# Patient Record
Sex: FEMALE | Race: WHITE | NOT HISPANIC OR LATINO | Employment: FULL TIME | URBAN - METROPOLITAN AREA
[De-identification: names, ages, dates, MRNs, and addresses within clinical notes are randomized per-mention and may not be internally consistent; named-entity substitution may affect disease eponyms.]

---

## 2017-12-30 ENCOUNTER — HOSPITAL ENCOUNTER (INPATIENT)
Facility: HOSPITAL | Age: 52
LOS: 2 days | Discharge: HOME | End: 2018-01-02
Attending: EMERGENCY MEDICINE | Admitting: HOSPITALIST

## 2017-12-30 ENCOUNTER — APPOINTMENT (OUTPATIENT)
Dept: CT IMAGING | Facility: HOSPITAL | Age: 52
End: 2017-12-30

## 2017-12-30 ENCOUNTER — APPOINTMENT (OUTPATIENT)
Dept: MRI IMAGING | Facility: HOSPITAL | Age: 52
End: 2017-12-30

## 2017-12-30 DIAGNOSIS — R11.2 INTRACTABLE VOMITING WITH NAUSEA, UNSPECIFIED VOMITING TYPE: Primary | ICD-10-CM

## 2017-12-30 DIAGNOSIS — R42 VERTIGO: ICD-10-CM

## 2017-12-30 LAB
ALBUMIN SERPL-MCNC: 3.8 G/DL (ref 3.2–4.8)
ALBUMIN/GLOB SERPL: 1 G/DL (ref 1.5–2.5)
ALP SERPL-CCNC: 104 U/L (ref 25–100)
ALT SERPL W P-5'-P-CCNC: 32 U/L (ref 7–40)
ANION GAP SERPL CALCULATED.3IONS-SCNC: 17 MMOL/L (ref 3–11)
AST SERPL-CCNC: 105 U/L (ref 0–33)
B-HCG UR QL: NEGATIVE
BACTERIA UR QL AUTO: ABNORMAL /HPF
BASOPHILS # BLD AUTO: 0.03 10*3/MM3 (ref 0–0.2)
BASOPHILS NFR BLD AUTO: 0.6 % (ref 0–1)
BILIRUB SERPL-MCNC: 1.7 MG/DL (ref 0.3–1.2)
BILIRUB UR QL STRIP: NEGATIVE
BUN BLD-MCNC: 6 MG/DL (ref 9–23)
BUN/CREAT SERPL: 7.5 (ref 7–25)
CALCIUM SPEC-SCNC: 8.7 MG/DL (ref 8.7–10.4)
CHLORIDE SERPL-SCNC: 101 MMOL/L (ref 99–109)
CLARITY UR: CLEAR
CO2 SERPL-SCNC: 19 MMOL/L (ref 20–31)
COLOR UR: YELLOW
CREAT BLD-MCNC: 0.8 MG/DL (ref 0.6–1.3)
DEPRECATED RDW RBC AUTO: 64.3 FL (ref 37–54)
EOSINOPHIL # BLD AUTO: 0 10*3/MM3 (ref 0–0.3)
EOSINOPHIL NFR BLD AUTO: 0 % (ref 0–3)
ERYTHROCYTE [DISTWIDTH] IN BLOOD BY AUTOMATED COUNT: 15.5 % (ref 11.3–14.5)
GFR SERPL CREATININE-BSD FRML MDRD: 75 ML/MIN/1.73
GLOBULIN UR ELPH-MCNC: 4 GM/DL
GLUCOSE BLD-MCNC: 184 MG/DL (ref 70–100)
GLUCOSE UR STRIP-MCNC: NEGATIVE MG/DL
HCT VFR BLD AUTO: 35.6 % (ref 34.5–44)
HGB BLD-MCNC: 12.3 G/DL (ref 11.5–15.5)
HGB UR QL STRIP.AUTO: ABNORMAL
HOLD SPECIMEN: NORMAL
HOLD SPECIMEN: NORMAL
HYALINE CASTS UR QL AUTO: ABNORMAL /LPF
IMM GRANULOCYTES # BLD: 0.01 10*3/MM3 (ref 0–0.03)
IMM GRANULOCYTES NFR BLD: 0.2 % (ref 0–0.6)
KETONES UR QL STRIP: NEGATIVE
LEUKOCYTE ESTERASE UR QL STRIP.AUTO: NEGATIVE
LIPASE SERPL-CCNC: 57 U/L (ref 6–51)
LYMPHOCYTES # BLD AUTO: 0.46 10*3/MM3 (ref 0.6–4.8)
LYMPHOCYTES NFR BLD AUTO: 8.9 % (ref 24–44)
MCH RBC QN AUTO: 39.3 PG (ref 27–31)
MCHC RBC AUTO-ENTMCNC: 34.6 G/DL (ref 32–36)
MCV RBC AUTO: 113.7 FL (ref 80–99)
MONOCYTES # BLD AUTO: 0.42 10*3/MM3 (ref 0–1)
MONOCYTES NFR BLD AUTO: 8.1 % (ref 0–12)
NEUTROPHILS # BLD AUTO: 4.25 10*3/MM3 (ref 1.5–8.3)
NEUTROPHILS NFR BLD AUTO: 82.2 % (ref 41–71)
NITRITE UR QL STRIP: NEGATIVE
PH UR STRIP.AUTO: 7 [PH] (ref 5–8)
PLATELET # BLD AUTO: 90 10*3/MM3 (ref 150–450)
PMV BLD AUTO: 11.7 FL (ref 6–12)
POTASSIUM BLD-SCNC: 2.9 MMOL/L (ref 3.5–5.5)
PROT SERPL-MCNC: 7.8 G/DL (ref 5.7–8.2)
PROT UR QL STRIP: NEGATIVE
RBC # BLD AUTO: 3.13 10*6/MM3 (ref 3.89–5.14)
RBC # UR: ABNORMAL /HPF
REF LAB TEST METHOD: ABNORMAL
SODIUM BLD-SCNC: 137 MMOL/L (ref 132–146)
SP GR UR STRIP: 1.01 (ref 1–1.03)
SQUAMOUS #/AREA URNS HPF: ABNORMAL /HPF
UROBILINOGEN UR QL STRIP: ABNORMAL
WBC NRBC COR # BLD: 5.17 10*3/MM3 (ref 3.5–10.8)
WBC UR QL AUTO: ABNORMAL /HPF
WHOLE BLOOD HOLD SPECIMEN: NORMAL
WHOLE BLOOD HOLD SPECIMEN: NORMAL

## 2017-12-30 PROCEDURE — 81001 URINALYSIS AUTO W/SCOPE: CPT | Performed by: EMERGENCY MEDICINE

## 2017-12-30 PROCEDURE — 25010000002 DIPHENHYDRAMINE PER 50 MG: Performed by: EMERGENCY MEDICINE

## 2017-12-30 PROCEDURE — 85025 COMPLETE CBC W/AUTO DIFF WBC: CPT | Performed by: EMERGENCY MEDICINE

## 2017-12-30 PROCEDURE — 81025 URINE PREGNANCY TEST: CPT | Performed by: EMERGENCY MEDICINE

## 2017-12-30 PROCEDURE — 25010000002 LORAZEPAM PER 2 MG: Performed by: EMERGENCY MEDICINE

## 2017-12-30 PROCEDURE — 80053 COMPREHEN METABOLIC PANEL: CPT | Performed by: EMERGENCY MEDICINE

## 2017-12-30 PROCEDURE — 82607 VITAMIN B-12: CPT | Performed by: NURSE PRACTITIONER

## 2017-12-30 PROCEDURE — 70551 MRI BRAIN STEM W/O DYE: CPT

## 2017-12-30 PROCEDURE — 25010000002 ONDANSETRON PER 1 MG: Performed by: EMERGENCY MEDICINE

## 2017-12-30 PROCEDURE — 83690 ASSAY OF LIPASE: CPT | Performed by: EMERGENCY MEDICINE

## 2017-12-30 PROCEDURE — 25010000002 PROMETHAZINE PER 50 MG: Performed by: EMERGENCY MEDICINE

## 2017-12-30 PROCEDURE — 99284 EMERGENCY DEPT VISIT MOD MDM: CPT

## 2017-12-30 PROCEDURE — 70450 CT HEAD/BRAIN W/O DYE: CPT

## 2017-12-30 PROCEDURE — 82746 ASSAY OF FOLIC ACID SERUM: CPT | Performed by: NURSE PRACTITIONER

## 2017-12-30 RX ORDER — LEVOTHYROXINE SODIUM 137 UG/1
137 TABLET ORAL DAILY
COMMUNITY

## 2017-12-30 RX ORDER — FAMOTIDINE 10 MG/ML
20 INJECTION, SOLUTION INTRAVENOUS ONCE
Status: COMPLETED | OUTPATIENT
Start: 2017-12-30 | End: 2017-12-30

## 2017-12-30 RX ORDER — PROMETHAZINE HYDROCHLORIDE 25 MG/ML
12.5 INJECTION, SOLUTION INTRAMUSCULAR; INTRAVENOUS ONCE
Status: COMPLETED | OUTPATIENT
Start: 2017-12-30 | End: 2017-12-30

## 2017-12-30 RX ORDER — ONDANSETRON 2 MG/ML
4 INJECTION INTRAMUSCULAR; INTRAVENOUS ONCE
Status: COMPLETED | OUTPATIENT
Start: 2017-12-30 | End: 2017-12-30

## 2017-12-30 RX ORDER — SODIUM CHLORIDE 0.9 % (FLUSH) 0.9 %
10 SYRINGE (ML) INJECTION AS NEEDED
Status: DISCONTINUED | OUTPATIENT
Start: 2017-12-30 | End: 2018-01-02 | Stop reason: HOSPADM

## 2017-12-30 RX ORDER — DIPHENHYDRAMINE HYDROCHLORIDE 50 MG/ML
50 INJECTION INTRAMUSCULAR; INTRAVENOUS ONCE
Status: COMPLETED | OUTPATIENT
Start: 2017-12-30 | End: 2017-12-30

## 2017-12-30 RX ORDER — LORAZEPAM 2 MG/ML
1 INJECTION INTRAMUSCULAR ONCE
Status: COMPLETED | OUTPATIENT
Start: 2017-12-30 | End: 2017-12-30

## 2017-12-30 RX ORDER — LORAZEPAM 2 MG/ML
0.5 INJECTION INTRAMUSCULAR ONCE
Status: COMPLETED | OUTPATIENT
Start: 2017-12-30 | End: 2017-12-30

## 2017-12-30 RX ORDER — PROMETHAZINE HYDROCHLORIDE 25 MG/ML
6.25 INJECTION, SOLUTION INTRAMUSCULAR; INTRAVENOUS ONCE
Status: COMPLETED | OUTPATIENT
Start: 2017-12-30 | End: 2017-12-30

## 2017-12-30 RX ORDER — DIAZEPAM 5 MG/ML
2 INJECTION, SOLUTION INTRAMUSCULAR; INTRAVENOUS ONCE
Status: DISCONTINUED | OUTPATIENT
Start: 2017-12-30 | End: 2017-12-30

## 2017-12-30 RX ADMIN — ONDANSETRON 4 MG: 2 INJECTION INTRAMUSCULAR; INTRAVENOUS at 16:53

## 2017-12-30 RX ADMIN — SODIUM CHLORIDE 1000 ML: 9 INJECTION, SOLUTION INTRAVENOUS at 16:13

## 2017-12-30 RX ADMIN — PROMETHAZINE HYDROCHLORIDE 12.5 MG: 25 INJECTION INTRAMUSCULAR; INTRAVENOUS at 16:53

## 2017-12-30 RX ADMIN — LORAZEPAM 0.5 MG: 2 INJECTION INTRAMUSCULAR; INTRAVENOUS at 19:52

## 2017-12-30 RX ADMIN — LORAZEPAM 1 MG: 2 INJECTION INTRAMUSCULAR; INTRAVENOUS at 21:25

## 2017-12-30 RX ADMIN — DIPHENHYDRAMINE HYDROCHLORIDE 50 MG: 50 INJECTION INTRAMUSCULAR; INTRAVENOUS at 19:52

## 2017-12-30 RX ADMIN — SODIUM CHLORIDE 1000 ML: 9 INJECTION, SOLUTION INTRAVENOUS at 18:08

## 2017-12-30 RX ADMIN — PROMETHAZINE HYDROCHLORIDE 6.25 MG: 25 INJECTION INTRAMUSCULAR; INTRAVENOUS at 16:14

## 2017-12-30 RX ADMIN — FAMOTIDINE 20 MG: 10 INJECTION, SOLUTION INTRAVENOUS at 18:08

## 2017-12-31 PROBLEM — I10 ESSENTIAL HYPERTENSION: Status: ACTIVE | Noted: 2017-12-31

## 2017-12-31 PROBLEM — R42 DIZZINESS: Status: ACTIVE | Noted: 2017-12-31

## 2017-12-31 PROBLEM — R11.2 INTRACTABLE VOMITING WITH NAUSEA: Status: ACTIVE | Noted: 2017-12-31

## 2017-12-31 PROBLEM — R11.2 NAUSEA WITH VOMITING: Status: ACTIVE | Noted: 2017-12-31

## 2017-12-31 PROBLEM — F10.939 ALCOHOL WITHDRAWAL SYNDROME WITH COMPLICATION (HCC): Status: ACTIVE | Noted: 2017-12-31

## 2017-12-31 PROBLEM — Z78.9 ALCOHOL USE: Status: ACTIVE | Noted: 2017-12-31

## 2017-12-31 LAB
ANION GAP SERPL CALCULATED.3IONS-SCNC: 11 MMOL/L (ref 3–11)
BUN BLD-MCNC: 7 MG/DL (ref 9–23)
BUN/CREAT SERPL: 7.8 (ref 7–25)
CA-I SERPL ISE-MCNC: 1.08 MMOL/L (ref 1.12–1.32)
CALCIUM SPEC-SCNC: 7.7 MG/DL (ref 8.7–10.4)
CHLORIDE SERPL-SCNC: 104 MMOL/L (ref 99–109)
CO2 SERPL-SCNC: 24 MMOL/L (ref 20–31)
CREAT BLD-MCNC: 0.9 MG/DL (ref 0.6–1.3)
DEPRECATED RDW RBC AUTO: 68.3 FL (ref 37–54)
ERYTHROCYTE [DISTWIDTH] IN BLOOD BY AUTOMATED COUNT: 16 % (ref 11.3–14.5)
FOLATE SERPL-MCNC: 5.05 NG/ML (ref 3.2–20)
GFR SERPL CREATININE-BSD FRML MDRD: 65 ML/MIN/1.73
GLUCOSE BLD-MCNC: 71 MG/DL (ref 70–100)
HCT VFR BLD AUTO: 31.3 % (ref 34.5–44)
HGB BLD-MCNC: 10.3 G/DL (ref 11.5–15.5)
MAGNESIUM SERPL-MCNC: 1.3 MG/DL (ref 1.3–2.7)
MCH RBC QN AUTO: 38.6 PG (ref 27–31)
MCHC RBC AUTO-ENTMCNC: 32.9 G/DL (ref 32–36)
MCV RBC AUTO: 117.2 FL (ref 80–99)
PHOSPHATE SERPL-MCNC: 2.9 MG/DL (ref 2.4–5.1)
PLATELET # BLD AUTO: 76 10*3/MM3 (ref 150–450)
PMV BLD AUTO: 12.1 FL (ref 6–12)
POTASSIUM BLD-SCNC: 2.5 MMOL/L (ref 3.5–5.5)
POTASSIUM BLD-SCNC: 3.8 MMOL/L (ref 3.5–5.5)
RBC # BLD AUTO: 2.67 10*6/MM3 (ref 3.89–5.14)
SODIUM BLD-SCNC: 139 MMOL/L (ref 132–146)
T4 FREE SERPL-MCNC: 1.07 NG/DL (ref 0.89–1.76)
TSH SERPL DL<=0.05 MIU/L-ACNC: 0.02 MIU/ML (ref 0.35–5.35)
VIT B12 BLD-MCNC: 342 PG/ML (ref 211–911)
WBC NRBC COR # BLD: 5.86 10*3/MM3 (ref 3.5–10.8)

## 2017-12-31 PROCEDURE — 25010000002 MAGNESIUM SULFATE PER 500 MG OF MAGNESIUM: Performed by: INTERNAL MEDICINE

## 2017-12-31 PROCEDURE — 84132 ASSAY OF SERUM POTASSIUM: CPT | Performed by: INTERNAL MEDICINE

## 2017-12-31 PROCEDURE — 25010000002 THIAMINE PER 100 MG: Performed by: INTERNAL MEDICINE

## 2017-12-31 PROCEDURE — 25010000002 LORAZEPAM PER 2 MG: Performed by: INTERNAL MEDICINE

## 2017-12-31 PROCEDURE — 85027 COMPLETE CBC AUTOMATED: CPT | Performed by: NURSE PRACTITIONER

## 2017-12-31 PROCEDURE — 82330 ASSAY OF CALCIUM: CPT | Performed by: NURSE PRACTITIONER

## 2017-12-31 PROCEDURE — 25010000002 MAGNESIUM SULFATE 2 GM/50ML SOLUTION: Performed by: INTERNAL MEDICINE

## 2017-12-31 PROCEDURE — 83735 ASSAY OF MAGNESIUM: CPT | Performed by: NURSE PRACTITIONER

## 2017-12-31 PROCEDURE — 25010000002 ENOXAPARIN PER 10 MG: Performed by: NURSE PRACTITIONER

## 2017-12-31 PROCEDURE — 84100 ASSAY OF PHOSPHORUS: CPT | Performed by: NURSE PRACTITIONER

## 2017-12-31 PROCEDURE — 84443 ASSAY THYROID STIM HORMONE: CPT | Performed by: NURSE PRACTITIONER

## 2017-12-31 PROCEDURE — 99223 1ST HOSP IP/OBS HIGH 75: CPT | Performed by: INTERNAL MEDICINE

## 2017-12-31 PROCEDURE — 80048 BASIC METABOLIC PNL TOTAL CA: CPT | Performed by: NURSE PRACTITIONER

## 2017-12-31 PROCEDURE — 84439 ASSAY OF FREE THYROXINE: CPT | Performed by: INTERNAL MEDICINE

## 2017-12-31 RX ORDER — LORAZEPAM 1 MG/1
2 TABLET ORAL
Status: DISCONTINUED | OUTPATIENT
Start: 2017-12-31 | End: 2018-01-02 | Stop reason: HOSPADM

## 2017-12-31 RX ORDER — POTASSIUM CHLORIDE 750 MG/1
40 CAPSULE, EXTENDED RELEASE ORAL AS NEEDED
Status: DISCONTINUED | OUTPATIENT
Start: 2017-12-31 | End: 2018-01-02 | Stop reason: HOSPADM

## 2017-12-31 RX ORDER — SODIUM CHLORIDE 0.9 % (FLUSH) 0.9 %
1-10 SYRINGE (ML) INJECTION AS NEEDED
Status: DISCONTINUED | OUTPATIENT
Start: 2017-12-31 | End: 2018-01-02 | Stop reason: HOSPADM

## 2017-12-31 RX ORDER — LORAZEPAM 2 MG/ML
1 INJECTION INTRAMUSCULAR ONCE
Status: COMPLETED | OUTPATIENT
Start: 2017-12-31 | End: 2017-12-31

## 2017-12-31 RX ORDER — LORAZEPAM 2 MG/ML
2 INJECTION INTRAMUSCULAR
Status: DISCONTINUED | OUTPATIENT
Start: 2017-12-31 | End: 2018-01-02 | Stop reason: HOSPADM

## 2017-12-31 RX ORDER — ACETAMINOPHEN 325 MG/1
650 TABLET ORAL EVERY 4 HOURS PRN
Status: DISCONTINUED | OUTPATIENT
Start: 2017-12-31 | End: 2018-01-02 | Stop reason: HOSPADM

## 2017-12-31 RX ORDER — CHLORDIAZEPOXIDE HYDROCHLORIDE 25 MG/1
50 CAPSULE, GELATIN COATED ORAL EVERY 8 HOURS SCHEDULED
Status: DISCONTINUED | OUTPATIENT
Start: 2017-12-31 | End: 2018-01-01

## 2017-12-31 RX ORDER — MAGNESIUM SULFATE HEPTAHYDRATE 40 MG/ML
2 INJECTION, SOLUTION INTRAVENOUS AS NEEDED
Status: DISCONTINUED | OUTPATIENT
Start: 2017-12-31 | End: 2018-01-02 | Stop reason: HOSPADM

## 2017-12-31 RX ORDER — FOLIC ACID 1 MG/1
1 TABLET ORAL DAILY
Status: DISCONTINUED | OUTPATIENT
Start: 2018-01-01 | End: 2018-01-02 | Stop reason: HOSPADM

## 2017-12-31 RX ORDER — POTASSIUM CHLORIDE 7.45 MG/ML
10 INJECTION INTRAVENOUS
Status: DISCONTINUED | OUTPATIENT
Start: 2017-12-31 | End: 2018-01-02 | Stop reason: HOSPADM

## 2017-12-31 RX ORDER — POTASSIUM CHLORIDE 1.5 G/1.77G
40 POWDER, FOR SOLUTION ORAL AS NEEDED
Status: DISCONTINUED | OUTPATIENT
Start: 2017-12-31 | End: 2018-01-02 | Stop reason: HOSPADM

## 2017-12-31 RX ORDER — LORAZEPAM 2 MG/ML
1 INJECTION INTRAMUSCULAR
Status: DISCONTINUED | OUTPATIENT
Start: 2017-12-31 | End: 2018-01-02 | Stop reason: HOSPADM

## 2017-12-31 RX ORDER — MAGNESIUM SULFATE HEPTAHYDRATE 40 MG/ML
4 INJECTION, SOLUTION INTRAVENOUS AS NEEDED
Status: DISCONTINUED | OUTPATIENT
Start: 2017-12-31 | End: 2018-01-02 | Stop reason: HOSPADM

## 2017-12-31 RX ORDER — NAPROXEN SODIUM 220 MG
TABLET ORAL
COMMUNITY

## 2017-12-31 RX ORDER — LEVOTHYROXINE SODIUM 137 UG/1
137 TABLET ORAL
Status: DISCONTINUED | OUTPATIENT
Start: 2017-12-31 | End: 2017-12-31

## 2017-12-31 RX ORDER — LORAZEPAM 1 MG/1
1 TABLET ORAL
Status: DISCONTINUED | OUTPATIENT
Start: 2017-12-31 | End: 2018-01-02 | Stop reason: HOSPADM

## 2017-12-31 RX ORDER — DIPHENOXYLATE HYDROCHLORIDE AND ATROPINE SULFATE 2.5; .025 MG/1; MG/1
1 TABLET ORAL DAILY
Status: DISCONTINUED | OUTPATIENT
Start: 2018-01-01 | End: 2018-01-02 | Stop reason: HOSPADM

## 2017-12-31 RX ORDER — THIAMINE MONONITRATE (VIT B1) 100 MG
100 TABLET ORAL DAILY
Status: DISCONTINUED | OUTPATIENT
Start: 2018-01-01 | End: 2018-01-02 | Stop reason: HOSPADM

## 2017-12-31 RX ADMIN — MAGNESIUM SULFATE HEPTAHYDRATE 2 G: 40 INJECTION, SOLUTION INTRAVENOUS at 13:34

## 2017-12-31 RX ADMIN — CHLORDIAZEPOXIDE HYDROCHLORIDE 50 MG: 25 CAPSULE ORAL at 21:36

## 2017-12-31 RX ADMIN — LEVOTHYROXINE SODIUM 137 MCG: 137 TABLET ORAL at 06:36

## 2017-12-31 RX ADMIN — POTASSIUM CHLORIDE 40 MEQ: 750 CAPSULE, EXTENDED RELEASE ORAL at 11:53

## 2017-12-31 RX ADMIN — LORAZEPAM 1 MG: 2 INJECTION INTRAMUSCULAR; INTRAVENOUS at 00:09

## 2017-12-31 RX ADMIN — POTASSIUM CHLORIDE 40 MEQ: 750 CAPSULE, EXTENDED RELEASE ORAL at 15:44

## 2017-12-31 RX ADMIN — CHLORDIAZEPOXIDE HYDROCHLORIDE 50 MG: 25 CAPSULE ORAL at 13:23

## 2017-12-31 RX ADMIN — POTASSIUM CHLORIDE 40 MEQ: 750 CAPSULE, EXTENDED RELEASE ORAL at 09:04

## 2017-12-31 RX ADMIN — MAGNESIUM SULFATE IN WATER 4 G: 40 INJECTION, SOLUTION INTRAVENOUS at 09:44

## 2017-12-31 RX ADMIN — THIAMINE HYDROCHLORIDE 100 ML/HR: 100 INJECTION, SOLUTION INTRAMUSCULAR; INTRAVENOUS at 09:44

## 2017-12-31 RX ADMIN — ACETAMINOPHEN 650 MG: 325 TABLET, FILM COATED ORAL at 11:53

## 2017-12-31 RX ADMIN — ENOXAPARIN SODIUM 40 MG: 40 INJECTION SUBCUTANEOUS at 06:36

## 2017-12-31 RX ADMIN — CHLORDIAZEPOXIDE HYDROCHLORIDE 50 MG: 25 CAPSULE ORAL at 02:10

## 2018-01-01 ENCOUNTER — APPOINTMENT (OUTPATIENT)
Dept: GENERAL RADIOLOGY | Facility: HOSPITAL | Age: 53
End: 2018-01-01

## 2018-01-01 LAB
ALBUMIN SERPL-MCNC: 3 G/DL (ref 3.2–4.8)
ALBUMIN/GLOB SERPL: 0.9 G/DL (ref 1.5–2.5)
ALP SERPL-CCNC: 78 U/L (ref 25–100)
ALT SERPL W P-5'-P-CCNC: 22 U/L (ref 7–40)
ANION GAP SERPL CALCULATED.3IONS-SCNC: 11 MMOL/L (ref 3–11)
AST SERPL-CCNC: 59 U/L (ref 0–33)
BILIRUB SERPL-MCNC: 1.3 MG/DL (ref 0.3–1.2)
BUN BLD-MCNC: 5 MG/DL (ref 9–23)
BUN/CREAT SERPL: 7.1 (ref 7–25)
CALCIUM SPEC-SCNC: 7.4 MG/DL (ref 8.7–10.4)
CHLORIDE SERPL-SCNC: 105 MMOL/L (ref 99–109)
CO2 SERPL-SCNC: 18 MMOL/L (ref 20–31)
CREAT BLD-MCNC: 0.7 MG/DL (ref 0.6–1.3)
DEPRECATED RDW RBC AUTO: 66.4 FL (ref 37–54)
ERYTHROCYTE [DISTWIDTH] IN BLOOD BY AUTOMATED COUNT: 15.5 % (ref 11.3–14.5)
FLUAV SUBTYP SPEC NAA+PROBE: NOT DETECTED
FLUBV RNA ISLT QL NAA+PROBE: NOT DETECTED
GFR SERPL CREATININE-BSD FRML MDRD: 88 ML/MIN/1.73
GLOBULIN UR ELPH-MCNC: 3.3 GM/DL
GLUCOSE BLD-MCNC: 95 MG/DL (ref 70–100)
HCT VFR BLD AUTO: 30.9 % (ref 34.5–44)
HGB BLD-MCNC: 10.3 G/DL (ref 11.5–15.5)
MAGNESIUM SERPL-MCNC: 2 MG/DL (ref 1.3–2.7)
MCH RBC QN AUTO: 39 PG (ref 27–31)
MCHC RBC AUTO-ENTMCNC: 33.3 G/DL (ref 32–36)
MCV RBC AUTO: 117 FL (ref 80–99)
PLATELET # BLD AUTO: 71 10*3/MM3 (ref 150–450)
PMV BLD AUTO: 12.1 FL (ref 6–12)
POTASSIUM BLD-SCNC: 3.2 MMOL/L (ref 3.5–5.5)
PROT SERPL-MCNC: 6.3 G/DL (ref 5.7–8.2)
RBC # BLD AUTO: 2.64 10*6/MM3 (ref 3.89–5.14)
SODIUM BLD-SCNC: 134 MMOL/L (ref 132–146)
WBC NRBC COR # BLD: 5.66 10*3/MM3 (ref 3.5–10.8)

## 2018-01-01 PROCEDURE — 25010000002 ENOXAPARIN PER 10 MG: Performed by: NURSE PRACTITIONER

## 2018-01-01 PROCEDURE — 71045 X-RAY EXAM CHEST 1 VIEW: CPT

## 2018-01-01 PROCEDURE — 87502 INFLUENZA DNA AMP PROBE: CPT | Performed by: INTERNAL MEDICINE

## 2018-01-01 PROCEDURE — 80053 COMPREHEN METABOLIC PANEL: CPT | Performed by: INTERNAL MEDICINE

## 2018-01-01 PROCEDURE — 99233 SBSQ HOSP IP/OBS HIGH 50: CPT | Performed by: INTERNAL MEDICINE

## 2018-01-01 PROCEDURE — 25010000002 THIAMINE PER 100 MG: Performed by: INTERNAL MEDICINE

## 2018-01-01 PROCEDURE — 84481 FREE ASSAY (FT-3): CPT | Performed by: INTERNAL MEDICINE

## 2018-01-01 PROCEDURE — 85027 COMPLETE CBC AUTOMATED: CPT | Performed by: INTERNAL MEDICINE

## 2018-01-01 PROCEDURE — 83735 ASSAY OF MAGNESIUM: CPT | Performed by: INTERNAL MEDICINE

## 2018-01-01 PROCEDURE — 25010000002 MAGNESIUM SULFATE PER 500 MG OF MAGNESIUM: Performed by: INTERNAL MEDICINE

## 2018-01-01 RX ORDER — CHLORDIAZEPOXIDE HYDROCHLORIDE 25 MG/1
25 CAPSULE, GELATIN COATED ORAL EVERY 8 HOURS SCHEDULED
Status: DISCONTINUED | OUTPATIENT
Start: 2018-01-01 | End: 2018-01-02 | Stop reason: HOSPADM

## 2018-01-01 RX ADMIN — THIAMINE HYDROCHLORIDE 100 ML/HR: 100 INJECTION, SOLUTION INTRAMUSCULAR; INTRAVENOUS at 09:09

## 2018-01-01 RX ADMIN — POTASSIUM CHLORIDE 40 MEQ: 750 CAPSULE, EXTENDED RELEASE ORAL at 17:45

## 2018-01-01 RX ADMIN — CHLORDIAZEPOXIDE HYDROCHLORIDE 25 MG: 25 CAPSULE ORAL at 15:34

## 2018-01-01 RX ADMIN — CHLORDIAZEPOXIDE HYDROCHLORIDE 25 MG: 25 CAPSULE ORAL at 21:36

## 2018-01-01 RX ADMIN — ACETAMINOPHEN 650 MG: 325 TABLET, FILM COATED ORAL at 10:19

## 2018-01-01 RX ADMIN — POTASSIUM CHLORIDE 40 MEQ: 750 CAPSULE, EXTENDED RELEASE ORAL at 21:36

## 2018-01-01 RX ADMIN — Medication 1 TABLET: at 09:09

## 2018-01-01 RX ADMIN — Medication 100 MG: at 09:09

## 2018-01-01 RX ADMIN — CHLORDIAZEPOXIDE HYDROCHLORIDE 50 MG: 25 CAPSULE ORAL at 05:36

## 2018-01-01 RX ADMIN — Medication 5 MG: at 21:36

## 2018-01-01 RX ADMIN — ENOXAPARIN SODIUM 40 MG: 40 INJECTION SUBCUTANEOUS at 05:36

## 2018-01-01 RX ADMIN — FOLIC ACID 1 MG: 1 TABLET ORAL at 09:09

## 2018-01-01 NOTE — PROGRESS NOTES
Discharge Planning Assessment  [facility]     Patient Name: Irais Love  MRN: [id number]  Today's Date: 1/1/2018    Admit Date: 12/30/2017          Discharge Needs Assessment       01/01/18 1034    Living Environment    Lives With spouse    Living Arrangements house   Lives in house c her spouse in [place], [place].    Home Accessibility bed and bath on same level    Stair Railings at Home inside, present on left side    Type of Financial/Environmental Concern none    Transportation Available car;family or friend will provide    Discharge Needs Assessment    Equipment Currently Used at Home none    Equipment Needed After Discharge none      01/01/18 1033    Living Environment    Provides Primary Care For no one    Quality Of Family Relationships supportive    Able to Return to Prior Living Arrangements yes    Discharge Needs Assessment    Concerns To Be Addressed discharge planning concerns;substance/tobacco abuse/use concerns    Readmission Within The Last 30 Days no previous admission in last 30 days            Discharge Plan       01/01/18 1036    Case Management/Social Work Plan    Plan Home c spouse    Patient/Family In Agreement With Plan yes    Additional Comments Spoke to Mrs. Love @ [facility].  She lives c her spouse in 1 level home in [place], [place].  She is independent c ADL's.  She has never used DME, HH or Home O2. She works as a  in a business with her spouse. Her spouse is getting airline tickets for 1/2/18 to return to their home.   MSW consulted to speak c Ms. Love about ETOH abuse and resources.  CM will follow.        Discharge Placement     No information found                Demographic Summary       01/01/18 1025    Referral Information    Referral Source admission list    Reason For Consult discharge planning    Contact Information    Permission Granted to Share Information With             Functional Status       01/01/18 1033    Functional Status Current     Ambulation 0-->independent    Transferring 0-->independent    Toileting 0-->independent    Bathing 0-->independent    Dressing 0-->independent    Eating 0-->independent    Communication 0-->understands/communicates without difficulty    Swallowing (if score 2 or more for any item, consult Rehab Services) 0-->swallows foods/liquids without difficulty    Functional Status Prior    Ambulation 0-->independent    Transferring 0-->independent    Toileting 0-->independent    Bathing 0-->independent    Dressing 0-->independent    Eating 0-->independent    Communication 0-->understands/communicates without difficulty    Swallowing 0-->swallows foods/liquids without difficulty    IADL    Medications independent    Meal Preparation independent    Housekeeping independent    Laundry independent    Shopping independent    Oral Care independent            Psychosocial     None            Abuse/Neglect     None            Legal     None            Substance Abuse     None            Patient Forms     None          Xander Cuello, RN

## 2018-01-01 NOTE — PROGRESS NOTES
[facility] Medicine Services  PROGRESS NOTE    Patient Name: Irais Love  : 1965  MRN: [id number]    Date of Admission: 2017  Length of Stay: 1  Primary Care Physician: No Known Provider    Subjective   Subjective     CC:    Alcohol withdrawal    HPI:    Still feels a little unsteady on her feet. Cough and nasal congestion. Denies fever.     Review of Systems   HENT: Negative.    Eyes: Negative.    Respiratory: Negative.    Cardiovascular: Negative.    Gastrointestinal: Negative.    Endocrine: Negative.    Genitourinary: Negative.    Musculoskeletal: Negative.    Skin: Negative.    Allergic/Immunologic: Negative.    Neurological: Positive for tremors and weakness.   Hematological: Negative.    Psychiatric/Behavioral: Negative.          Otherwise ROS is negative except as mentioned in the HPI.    Objective   Objective     Vital Signs:   Temp:  [97.5 °F (36.4 °C)-99 °F (37.2 °C)] 99 °F (37.2 °C)  Heart Rate:  [] 86  Resp:  [16-20] 18  BP: (141-165)/(75-86) 165/79        Physical Exam:    Constitutional -in bed, asleep but arouses easily to voice. Very slight hand tremor noted. Able to ambulate unassisted to bathroom, a little unsteady at first.  HEENT-NCAT, mucous membranes moist  CV-regular rate and rhythm, no murmur  Resp-CTAB, no wheezes, rhonchi or rales  Abd-soft, non-tender, non-distended, normo active bowel sounds  Ext-No lower extremity cyanosis, clubbing or edema bilaterally  Neuro-alert, speech clear, moves all extremities, mild hand tremors  Psych-still with slightly anxious affect   Skin- No rash on exposed UE or LE bilaterally      Results Reviewed:  I have personally reviewed current lab, radiology, and data and agree.      Results from last 7 days  Lab Units 18  0622 17  0509 17  1503   WBC 10*3/mm3 5.66 5.86 5.17   HEMOGLOBIN g/dL 10.3* 10.3* 12.3   HEMATOCRIT % 30.9* 31.3* 35.6   PLATELETS 10*3/mm3 71* 76* 90*       Results from last 7  days  Lab Units 01/01/18  0622 12/31/17  2102 12/31/17  0509 12/30/17  1503   SODIUM mmol/L 134  --  139 137   POTASSIUM mmol/L 3.2* 3.8 2.5* 2.9*   CHLORIDE mmol/L 105  --  104 101   CO2 mmol/L 18.0*  --  24.0 19.0*   BUN mg/dL 5*  --  7* 6*   CREATININE mg/dL 0.70  --  0.90 0.80   GLUCOSE mg/dL 95  --  71 184*   CALCIUM mg/dL 7.4*  --  7.7* 8.7   ALT (SGPT) U/L 22  --   --  32   AST (SGOT) U/L 59*  --   --  105*     No results found for: BNP  No results found for: PHART    Microbiology Results Abnormal     None          Imaging Results (last 24 hours)     Procedure Component Value Units Date/Time    CT Head Without Contrast [[id number]] Collected:  12/31/17 1340     Updated:  12/31/17 1352    Narrative:       EXAMINATION: CT HEAD WO CONTRAST - 12/31/2017     INDICATION: Vertigo, vomiting.      TECHNIQUE: Axial CT of the head without intravenous contrast  administration.     The radiation dose reduction device was turned on for each scan per the  ALARA (As Low as Reasonably Achievable) protocol.     COMPARISON: None.     FINDINGS: Midline structures are symmetric without evidence of mass,  mass effect or midline shift. No intra-axial hemorrhage. Ventricles and  sulci within normal limits. Basal cisterns patent. Globes and orbits  unremarkable. Visualized paranasal sinuses demonstrate circumferential  mucosal thickening of the bilateral maxillary sinuses and ethmoid air  cells; otherwise, grossly clear and well-pneumatized. Mastoid air cells  are grossly clear. Calvarium is intact without depressed fracture or  aggressive lesion.       Impression:       1. No acute intracranial abnormality.  2. Paranasal mucosal edema of the maxillary sinuses and ethmoid air  cells may represent component of sinusitis.     DICTATED:     12/30/2017  EDITED:          12/31/2017     This report was finalized on 12/31/2017 1:50 PM by Dr. Garry Sullivan.                I have reviewed the medications.    chlordiazePOXIDE 25 mg Oral Q8H    enoxaparin 40 mg Subcutaneous Q24H   vitamin B-1 100 mg Oral Daily   And      multivitamin 1 tablet Oral Daily   And      folic acid 1 mg Oral Daily   IV Fluids 1000 mL + additives 100 mL/hr Intravenous Daily       Assessment/Plan   Assessment / Plan     Hospital Problem List     * (Principal)Alcohol withdrawal syndrome with complication    Alcohol use    Dizziness    Essential hypertension    Nausea with vomiting    Intractable vomiting with nausea             Brief Hospital Course to date:  Irais Love is a 52 y.o. female n/v, dizziness and alcohol withdrawal      Assessment & Plan:    Acute Alcohol Withdrawal  - suspect symptoms started after patient developed nausea and vomiting with concurrent inability to drink alcohol. MRI brain obtained with no findings suggestive of acute stroke, and both GI symptoms and dizziness have improved. Spouse says Mrs Love has experienced similar events in the past. Lab work, including elevated LFTs and low platelets consistent with chronic alcohol consumption.    - Patient currently being treated under [facility] protocol with scheduled librium and prn ativan. Thiamine/rally pack also provided daily.    - Will taper scheduled librium today    - Consider serax taper at discharge, but this will depend on how committed patient is to seeking recovery from alcohol abuse. Will discuss again with her this afternoon. Social work to visit patient and provide abstinence resources.    - hospitalization somewhat complicated by fact that patient and spouse are from [place], here visiting family. Their departure flight was scheduled for this moring, but medically I think it would be unwise to rush the discharge so they can make the trip home.  If the patient remains stable from a withdrawal standpoint with tapered benzodiazepines, one may consider discharge tomorrow (early afternoon) - and I discussed this possibility with the patient and her spouse - but we will need to continue to monitor her  response to the lower dose of medications.  They are in agreement with this plan    Cough and sinus congestion  - afebrile, no leukocytosis, however influenza is rampant -- will check flu PCR and a CXR today.    Hypothyroid  - TSH supressed, checked Free T4 (wnl), free T3 pending. Held levothyroxine today, will step down dose at discharge    TCP  - likely due to Etoh     Elevated LFTs    Anemia  - elevated MCV, consistent with bone marrow suppression from alcohol  - continue supplementation  - check b12/folate levels on blood drawn at admit.    Hypokalemia  - likely due to N/V  - improved today, continue to replace k/mag  - bmp am    N/V  - resolved, restart bland diet    DVT Prophylaxis:  lovenox    CODE STATUS: Full Code     Disposition: I expect the patient to be discharged home in 1-2 days.    Jerry Eisenberg MD  01/01/18  8:46 AM

## 2018-01-01 NOTE — PLAN OF CARE
Problem: Patient Care Overview (Adult)  Goal: Plan of Care Review  Outcome: Ongoing (interventions implemented as appropriate)      Problem: Acute Alcohol Withdrawal Syndrome, Risk For/Actual (Adult)  Goal: Signs and Symptoms of Listed Potential Problems Will be Absent or Manageable (Acute Alcohol Withdrawal Syndrome, Risk For/Actual)  Outcome: Ongoing (interventions implemented as appropriate)   01/01/18 1824   Acute Alcohol Withdrawal Syndrome, Risk For/Actual   Problems Assessed (Alcohol Withdrawal Syndrome) all   Problems Present (Alcohol Withdrawal Syndrome) none

## 2018-01-01 NOTE — PLAN OF CARE
Problem: Patient Care Overview (Adult)  Goal: Plan of Care Review  Outcome: Ongoing (interventions implemented as appropriate)    Goal: Adult Individualization and Mutuality  Outcome: Ongoing (interventions implemented as appropriate)    Goal: Discharge Needs Assessment  Outcome: Ongoing (interventions implemented as appropriate)

## 2018-01-02 VITALS
RESPIRATION RATE: 18 BRPM | SYSTOLIC BLOOD PRESSURE: 153 MMHG | WEIGHT: 128 LBS | DIASTOLIC BLOOD PRESSURE: 77 MMHG | OXYGEN SATURATION: 98 % | BODY MASS INDEX: 24.17 KG/M2 | HEIGHT: 61 IN | HEART RATE: 101 BPM | TEMPERATURE: 98.7 F

## 2018-01-02 LAB
POTASSIUM BLD-SCNC: 4.3 MMOL/L (ref 3.5–5.5)
T3FREE SERPL-MCNC: 1.7 PG/ML (ref 2–4.4)

## 2018-01-02 PROCEDURE — 99239 HOSP IP/OBS DSCHRG MGMT >30: CPT | Performed by: HOSPITALIST

## 2018-01-02 PROCEDURE — 25010000002 THIAMINE PER 100 MG: Performed by: INTERNAL MEDICINE

## 2018-01-02 PROCEDURE — 84132 ASSAY OF SERUM POTASSIUM: CPT | Performed by: INTERNAL MEDICINE

## 2018-01-02 PROCEDURE — 25010000002 ENOXAPARIN PER 10 MG: Performed by: NURSE PRACTITIONER

## 2018-01-02 PROCEDURE — 25010000002 MAGNESIUM SULFATE PER 500 MG OF MAGNESIUM: Performed by: INTERNAL MEDICINE

## 2018-01-02 RX ORDER — CHLORDIAZEPOXIDE HYDROCHLORIDE 25 MG/1
25 CAPSULE, GELATIN COATED ORAL EVERY 8 HOURS SCHEDULED
Qty: 8 CAPSULE | Refills: 0 | Status: SHIPPED | OUTPATIENT
Start: 2018-01-02 | End: 2018-01-05

## 2018-01-02 RX ORDER — CHLORDIAZEPOXIDE HYDROCHLORIDE 25 MG/1
25 CAPSULE, GELATIN COATED ORAL EVERY 8 HOURS SCHEDULED
Qty: 2 CAPSULE | Refills: 0 | Status: SHIPPED | OUTPATIENT
Start: 2018-01-02 | End: 2018-01-02

## 2018-01-02 RX ORDER — CHLORDIAZEPOXIDE HYDROCHLORIDE 25 MG/1
25 CAPSULE, GELATIN COATED ORAL DAILY
Status: DISCONTINUED | OUTPATIENT
Start: 2018-01-05 | End: 2018-01-02 | Stop reason: HOSPADM

## 2018-01-02 RX ORDER — CHLORDIAZEPOXIDE HYDROCHLORIDE 25 MG/1
25 CAPSULE, GELATIN COATED ORAL EVERY 12 HOURS
Status: DISCONTINUED | OUTPATIENT
Start: 2018-01-03 | End: 2018-01-02 | Stop reason: HOSPADM

## 2018-01-02 RX ORDER — LANOLIN ALCOHOL/MO/W.PET/CERES
100 CREAM (GRAM) TOPICAL DAILY
Qty: 1 TABLET | Refills: 0 | Status: SHIPPED | OUTPATIENT
Start: 2018-01-03 | End: 2018-01-02

## 2018-01-02 RX ORDER — CHLORDIAZEPOXIDE HYDROCHLORIDE 25 MG/1
25 CAPSULE, GELATIN COATED ORAL DAILY
Qty: 2 CAPSULE | Refills: 0 | Status: SHIPPED | OUTPATIENT
Start: 2018-01-05 | End: 2018-01-02 | Stop reason: HOSPADM

## 2018-01-02 RX ORDER — FOLIC ACID 1 MG/1
1 TABLET ORAL DAILY
Qty: 1 TABLET | Refills: 0 | Status: SHIPPED | OUTPATIENT
Start: 2018-01-03 | End: 2018-01-02

## 2018-01-02 RX ORDER — CHLORDIAZEPOXIDE HYDROCHLORIDE 25 MG/1
25 CAPSULE, GELATIN COATED ORAL EVERY 12 HOURS
Qty: 4 CAPSULE | Refills: 0 | Status: SHIPPED | OUTPATIENT
Start: 2018-01-03 | End: 2018-01-02 | Stop reason: HOSPADM

## 2018-01-02 RX ORDER — DIPHENOXYLATE HYDROCHLORIDE AND ATROPINE SULFATE 2.5; .025 MG/1; MG/1
1 TABLET ORAL DAILY
Qty: 1 TABLET | Refills: 0 | Status: SHIPPED | OUTPATIENT
Start: 2018-01-03 | End: 2018-01-02

## 2018-01-02 RX ORDER — LANOLIN ALCOHOL/MO/W.PET/CERES
100 CREAM (GRAM) TOPICAL DAILY
Qty: 30 TABLET | Refills: 0 | Status: SHIPPED | OUTPATIENT
Start: 2018-01-03

## 2018-01-02 RX ORDER — FOLIC ACID 1 MG/1
1 TABLET ORAL DAILY
Qty: 30 TABLET | Refills: 0 | Status: SHIPPED | OUTPATIENT
Start: 2018-01-03

## 2018-01-02 RX ADMIN — ENOXAPARIN SODIUM 40 MG: 40 INJECTION SUBCUTANEOUS at 05:47

## 2018-01-02 RX ADMIN — Medication 100 MG: at 09:29

## 2018-01-02 RX ADMIN — Medication 1 TABLET: at 09:29

## 2018-01-02 RX ADMIN — FOLIC ACID 1 MG: 1 TABLET ORAL at 09:28

## 2018-01-02 RX ADMIN — THIAMINE HYDROCHLORIDE 100 ML/HR: 100 INJECTION, SOLUTION INTRAMUSCULAR; INTRAVENOUS at 09:27

## 2018-01-02 RX ADMIN — CHLORDIAZEPOXIDE HYDROCHLORIDE 25 MG: 25 CAPSULE ORAL at 05:47

## 2018-01-02 NOTE — NURSING NOTE
Patient is in the process of obtaining a primary provider and will make a follow-up appointment in [place] when they return this evening.

## 2018-01-02 NOTE — PROGRESS NOTES
Continued Stay Note  [facility]     Patient Name: Irais Love  MRN: [id number]  Today's Date: 1/2/2018    Admit Date: 12/30/2017          Discharge Plan       01/02/18 1142    Case Management/Social Work Plan    Plan JAMES provided resources    Patient/Family In Agreement With Plan yes    Additional Comments JAMES met with pt. this morning; pt. was up ambulating in the room.  Pt. is prepared for discharge today.  SW and pt discussed alcohol cessation resources.  Pt. is a resident of [place] and stated she has been to rehab 3-4 times and was aware of services available to her in [place].  Pt. did accept resources offered by JAMES for alcohol abuse treatment.  Pt. denied having any other needs at this time.              Discharge Codes     None        Expected Discharge Date and Time     Expected Discharge Date Expected Discharge Time    Jan 2, 2018             VALENCIA Peña

## 2018-01-02 NOTE — DISCHARGE SUMMARY
[facility] Medicine Services  DISCHARGE SUMMARY    Patient Name: Irais Love  : 1965  MRN: [id number]    Date of Admission: 2017  Date of Discharge:  2018  Length of Stay: 2  Primary Care Physician: No Known Provider    Consults     No orders found from 2017 to 2017.        Hospital Course     Presenting Problem:   Intractable vomiting with nausea, unspecified vomiting type [R11.2]    Active Hospital Problems (** Indicates Principal Problem)    Diagnosis Date Noted   • **Alcohol withdrawal syndrome with complication [F10.239] 2017   • Alcohol use [Z78.9] 2017   • Dizziness [R42] 2017   • Essential hypertension [I10] 2017   • Nausea with vomiting [R11.2] 2017   • Intractable vomiting with nausea [R11.2] 2017      Resolved Hospital Problems    Diagnosis Date Noted Date Resolved   No resolved problems to display.          Hospital Course:  Irais Love is a 52 y.o. female who presents [facility] emergency department with complaints of nausea, vomiting, diarrhea, lightheadedness, and dizziness.  reports that she drinks at least 3 glasses of wine with breakfast.     Pt was for alcohol withdrawal.  Started on Librium and tolerated tapering dose. Plan to fly back to [place] today. Discharged on tapering doses of Librium, daily folate, thiamine.      Day of Discharge     HPI:   Patient reports feeling well.  Denies trauma or feeling anxious. No PRN ativan doses needed.    Review of Systems  Gen- No fevers or chills  CV- No chest pain or palpitations  Resp- No cough or dyspnea  GI- No N/V/D or abd pain    Otherwise ROS is negative except as mentioned in the HPI.    Vital Signs:   Temp:  [98.4 °F (36.9 °C)-98.7 °F (37.1 °C)] 98.7 °F (37.1 °C)  Heart Rate:  [101] 101  Resp:  [18] 18  BP: (153-162)/(77-80) 153/77     Physical Exam:  Constitutional: No acute distress, awake, alert on RA  Eyes: PERRLA, sclerae anicteric,  no conjunctival injection  HENT: NCAT, mucous membranes moist  Neck: Supple, no thyromegaly, no lymphadenopathy, trachea midline  Respiratory: Clear to auscultation bilaterally, nonlabored respirations   Cardiovascular: RRR, no murmurs, rubs, or gallops, palpable pedal pulses bilaterally  Gastrointestinal: Positive bowel sounds, soft, nontender, nondistended  Musculoskeletal: No bilateral ankle edema, no clubbing or cyanosis to extremities  Psychiatric: Appropriate affect, cooperative  Neurologic: Oriented x 3, strength symmetric in all extremities, Cranial Nerves grossly intact to confrontation, speech clear  Skin: No rashes    Pertinent  and/or Most Recent Results         Results from last 7 days  Lab Units 01/02/18  0234 01/01/18  0622 12/31/17  2102 12/31/17  0509 12/30/17  1503   WBC 10*3/mm3  --  5.66  --  5.86 5.17   HEMOGLOBIN g/dL  --  10.3*  --  10.3* 12.3   HEMATOCRIT %  --  30.9*  --  31.3* 35.6   PLATELETS 10*3/mm3  --  71*  --  76* 90*   SODIUM mmol/L  --  134  --  139 137   POTASSIUM mmol/L 4.3 3.2* 3.8 2.5* 2.9*   CHLORIDE mmol/L  --  105  --  104 101   CO2 mmol/L  --  18.0*  --  24.0 19.0*   BUN mg/dL  --  5*  --  7* 6*   CREATININE mg/dL  --  0.70  --  0.90 0.80   GLUCOSE mg/dL  --  95  --  71 184*   CALCIUM mg/dL  --  7.4*  --  7.7* 8.7       Results from last 7 days  Lab Units 01/01/18  0622 12/30/17  1503   BILIRUBIN mg/dL 1.3* 1.7*   ALK PHOS U/L 78 104*   ALT (SGPT) U/L 22 32   AST (SGOT) U/L 59* 105*           Invalid input(s): TG, LDLREALC    Results from last 7 days  Lab Units 12/31/17  0509   TSH mIU/mL 0.016*     Brief Urine Lab Results  (Last result in the past 365 days)      Color   Clarity   Blood   Leuk Est   Nitrite   Protein   CREAT   Urine HCG        12/30/17 1537               Negative     12/30/17 1537 Yellow Clear Moderate (2+)(A) Negative Negative Negative               Microbiology Results Abnormal     Procedure Component Value - Date/Time    Influenza A & B, RT PCR - Swab,  Nasopharynx [[id number]]  (Normal) Collected:  01/01/18 1000    Lab Status:  Final result Specimen:  Swab from Nasopharynx Updated:  01/01/18 1117     Influenza A PCR Not Detected     Influenza B PCR Not Detected          Imaging Results (all)     Procedure Component Value Units Date/Time    MRI Brain Without Contrast [[id number]] Collected:  12/30/17 2057     Updated:  12/30/17 2218    Narrative:       EXAM:    MR Head Without Intravenous Contrast    CLINICAL HISTORY:    52 years old, female; Signs and symptoms; Dizziness and other: Nausea,   vomiting; Patient HX: Possible acute CVA, dizziness, vertigo, nausea, vomiting;   Additional info: Intractable vertigo, concern for cerebellar stroke    TECHNIQUE:    Magnetic resonance images of the head/brain without intravenous contrast in   multiple planes.    COMPARISON:    CT HEAD WO CONTRAST 2017-12-30 17:52    FINDINGS:      Brain:  The cortical sulci are enlarged consistent with cerebral atrophy.  No   hemorrhage.  No acute infarct.      Ventricles:  The ventricles are enlarged consistent with volume loss.      Bones/joints:  Unremarkable.      Sinuses:  Minimal scattered sinus disease.      Mastoid air cells:  Unremarkable as visualized.  No mastoid effusion.      Orbits:  Unremarkable as visualized.      Impression:           No acute findings. No acute infarction    Cerebral atrophy      THIS DOCUMENT HAS BEEN ELECTRONICALLY SIGNED BY BRE GOLD MD    CT Head Without Contrast [[id number]] Collected:  12/31/17 1340     Updated:  12/31/17 1352    Narrative:       EXAMINATION: CT HEAD WO CONTRAST - 12/31/2017     INDICATION: Vertigo, vomiting.      TECHNIQUE: Axial CT of the head without intravenous contrast  administration.     The radiation dose reduction device was turned on for each scan per the  ALARA (As Low as Reasonably Achievable) protocol.     COMPARISON: None.     FINDINGS: Midline structures are symmetric without evidence of mass,  mass effect or midline  shift. No intra-axial hemorrhage. Ventricles and  sulci within normal limits. Basal cisterns patent. Globes and orbits  unremarkable. Visualized paranasal sinuses demonstrate circumferential  mucosal thickening of the bilateral maxillary sinuses and ethmoid air  cells; otherwise, grossly clear and well-pneumatized. Mastoid air cells  are grossly clear. Calvarium is intact without depressed fracture or  aggressive lesion.       Impression:       1. No acute intracranial abnormality.  2. Paranasal mucosal edema of the maxillary sinuses and ethmoid air  cells may represent component of sinusitis.     DICTATED:     12/30/2017  EDITED:          12/31/2017     This report was finalized on 12/31/2017 1:50 PM by Dr. Garry Sullivan.       XR Chest 1 View [[id number]] Collected:  01/01/18 1020     Updated:  01/01/18 1827    Narrative:       EXAMINATION: XR CHEST 1 VW-      INDICATION: Cough, congestion; R11.2-Nausea with vomiting, unspecified;  R42-Dizziness and giddiness.      COMPARISON: None.     FINDINGS: Cardiac silhouette within normal limits. Pulmonary vascularity  within normal limits. No focal opacification or consolidation. No  pneumothorax or pleural effusion.           Impression:       No acute cardiopulmonary process.     D:  01/01/2018  E:  01/01/2018     This report was finalized on 1/1/2018 6:25 PM by Dr. Garry Sullivan.                    Discharge Details      Irais Love   Home Medication Instructions ELDER:[id number]    Printed on:01/02/18 1823   Medication Information                      Acetaminophen (TYLENOL PO)  Take  by mouth. prn             chlordiazePOXIDE (LIBRIUM) 25 MG capsule  Take 1 capsule every 8 hours for 2doses, then every 12 hours for 4 doses, then once daily for 2 doses             folic acid (FOLVITE) 1 MG tablet  Take 1 tablet by mouth Daily.             IBUPROFEN PO  Take  by mouth. prn             levothyroxine (SYNTHROID, LEVOTHROID) 137 MCG tablet  Take 137 mcg by mouth Daily.              Multiple Vitamins-Minerals (MULTIVITAMIN ADULTS) tablet  Take 1 tablet by mouth Daily.             naproxen sodium (ALEVE) 220 MG tablet  Take  by mouth. prn             Norgestrel-Ethinyl Estradiol (LOW-OGESTREL PO)  Take 1 tablet by mouth Daily.             thiamine (VITAMIN B1) 100 MG tablet  Take 1 tablet by mouth Daily.                   Discharge Disposition:  Home or Self Care    Discharge Diet:  Regular    Discharge Activity:  As tolerated    No future appointments.    Additional Instructions for the Follow-ups that You Need to Schedule     Discharge Follow-up with PCP    As directed    Follow Up Details:  1-2 weeks                     Time Spent on Discharge:  >35min    Brandi Clark MD  01/02/18  6:23 PM

## 2018-01-02 NOTE — PAYOR COMM NOTE
"Irais Love (52 y.o. Female)     Date of Birth Social Security Number Address Home Phone MRN    1965  [id number] [facility] [phone] [id number]    Gnosticism Marital Status          None        Admission Date Admission Type Admitting Provider Attending Provider Department, Room/Bed    17 Emergency Brandi Clark MD  [facility] 5G, S560/1    Discharge Date Discharge Disposition Discharge Destination        2018 Home or Self Care             Attending Provider: (none)    Allergies:  No Known Allergies    Isolation:  None   Infection:  None   Code Status:  FULL    Ht:  154.9 cm (61\")   Wt:  58.1 kg (128 lb)    Admission Cmt:  None   Principal Problem:  Alcohol withdrawal syndrome with complication [F10.239]                 Active Insurance as of 2017     Primary Coverage     Payor Plan Insurance Group Employer/Plan Group    [employer] MEJ[employer] RULE [id number]     Payor Plan Address Payor Plan Phone Number Effective From Effective To    [address] [address] [phone] [address]     [place] [address]       Subscriber Name Subscriber Birth Date Member ID       CHASE LOVE SR 1964 [id number]                 Emergency Contacts      (Rel.) Home Phone Work Phone Mobile Phone    Fco Love (Spouse) [phone] -- --               History & Physical      Teddy Pires MD at 2017  1:12 AM              [facility] Medicine Services  HISTORY AND PHYSICAL    Patient Name: Irais Love  : 1965  MRN: [id number]  Primary Care Physician: No Known Provider    Subjective   Subjective     Chief Complaint: Nausea/Vomting/Dizziness    HPI:  Irais Love is a 52 y.o. female who presents [facility] emergency department with complaints of nausea, vomiting, diarrhea, lightheadedness, and dizziness.  Patient is traveling from out of town and is in [place] to visit family.   is at " bedside.  Patient states that she woke up with dizziness and her symptoms have worsened through the day.   states that the patient has had multiple episodes prior to this when she doesn't have enough alcohol to drink.  He reports that she drinks at least 3 glasses of wine with breakfast.  He states that when she awakes in the morning she has a tremor until she has her morning alcohol.  He states that she continues to drink beer through the day and drinks 9+ beers through out the day.   reports it has been 24 hours or longer since her last drink.  While in the emergency department patient underwent CT and MRI.  Both were negative.  Patient has been treated with Benadryl, Pepcid, Zofran, Phenergan, 2 L normal saline IV fluid bolus, and Ativan.  Patient admitted the hospital medicine service for further evaluation treatment.    Review of Systems   Constitutional: Negative for chills, diaphoresis, fatigue and fever.   Respiratory: Positive for cough. Negative for shortness of breath and wheezing.    Cardiovascular: Negative for chest pain, palpitations and leg swelling.   Gastrointestinal: Positive for diarrhea, nausea and vomiting. Negative for abdominal pain and constipation.   Genitourinary: Negative for dysuria, frequency and urgency.   Musculoskeletal: Negative for arthralgias and myalgias.   Skin: Negative for color change, pallor, rash and wound.   Neurological: Positive for dizziness, tremors and light-headedness. Negative for seizures, syncope, facial asymmetry, speech difficulty, weakness, numbness and headaches.   Psychiatric/Behavioral: Negative for agitation and confusion.   All other systems reviewed and are negative.    Otherwise 10-system ROS reviewed and is negative except as mentioned in the HPI.    Personal History     Past Medical History:   Diagnosis Date   • Disease of thyroid gland        History reviewed. No pertinent surgical history.    Family History: family history is not on  file.     Social History:  reports that she has never smoked. She has never used smokeless tobacco. She reports that she drinks alcohol. She reports that she does not use illicit drugs.  Social History     Social History Narrative   • No narrative on file       Medications:  Prescriptions Prior to Admission   Medication Sig Dispense Refill Last Dose   • Acetaminophen (TYLENOL PO) Take  by mouth. prn      • IBUPROFEN PO Take  by mouth. prn      • levothyroxine (SYNTHROID, LEVOTHROID) 137 MCG tablet Take 137 mcg by mouth Daily.      • naproxen sodium (ALEVE) 220 MG tablet Take  by mouth. prn      • Norgestrel-Ethinyl Estradiol (LOW-OGESTREL PO) Take 1 tablet by mouth Daily. FOR 28 DAYS/MONTH          No Known Allergies    Objective   Objective     Vital Signs:   Temp:  [97.8 °F (36.6 °C)-98 °F (36.7 °C)] 98 °F (36.7 °C)  Heart Rate:  [98] 98  Resp:  [20-24] 20  BP: (123-180)/(71-93) 123/71        Physical Exam   Constitutional: She appears well-developed and well-nourished.   HENT:   Head: Normocephalic and atraumatic.   Eyes: EOM are normal. Pupils are equal, round, and reactive to light. No scleral icterus.   Neck: Normal range of motion. Neck supple. No JVD present.   Cardiovascular: Normal rate, regular rhythm, normal heart sounds and intact distal pulses.  Exam reveals no gallop and no friction rub.    No murmur heard.  Pulmonary/Chest: Effort normal and breath sounds normal. No respiratory distress. She has no wheezes. She has no rales. She exhibits no tenderness.   Abdominal: Soft. Bowel sounds are normal. She exhibits no distension and no mass. There is no tenderness. There is no rebound and no guarding. No hernia.   Musculoskeletal: Normal range of motion. She exhibits no edema, tenderness or deformity.   Neurological: She is alert. She has normal strength. No sensory deficit.   Skin: Skin is warm and dry. No rash noted. No erythema. No pallor.   Psychiatric: She has a normal mood and affect. Her speech is  normal. She is slowed. Cognition and memory are impaired.   Nursing note and vitals reviewed.     Results Reviewed:  I have personally reviewed current lab, radiology, and data and agree.      Results from last 7 days  Lab Units 12/30/17  1503   WBC 10*3/mm3 5.17   HEMOGLOBIN g/dL 12.3   HEMATOCRIT % 35.6   PLATELETS 10*3/mm3 90*       Results from last 7 days  Lab Units 12/30/17  1503   SODIUM mmol/L 137   POTASSIUM mmol/L 2.9*   CHLORIDE mmol/L 101   CO2 mmol/L 19.0*   BUN mg/dL 6*   CREATININE mg/dL 0.80   GLUCOSE mg/dL 184*   CALCIUM mg/dL 8.7   ALT (SGPT) U/L 32   AST (SGOT) U/L 105*     Brief Urine Lab Results  (Last result in the past 365 days)      Color   Clarity   Blood   Leuk Est   Nitrite   Protein   CREAT   Urine HCG        12/30/17 1537               Negative     12/30/17 1537 Yellow Clear Moderate (2+)(A) Negative Negative Negative             No results found for: BNP  No results found for: PHART  Imaging Results (last 24 hours)     Procedure Component Value Units Date/Time    CT Head Without Contrast [[id number]] Updated:  12/30/17 1756    MRI Brain Without Contrast [[id number]] Collected:  12/30/17 2057     Updated:  12/30/17 2218    Narrative:       EXAM:    MR Head Without Intravenous Contrast    CLINICAL HISTORY:    52 years old, female; Signs and symptoms; Dizziness and other: Nausea,   vomiting; Patient HX: Possible acute CVA, dizziness, vertigo, nausea, vomiting;   Additional info: Intractable vertigo, concern for cerebellar stroke    TECHNIQUE:    Magnetic resonance images of the head/brain without intravenous contrast in   multiple planes.    COMPARISON:    CT HEAD WO CONTRAST 2017-12-30 17:52    FINDINGS:      Brain:  The cortical sulci are enlarged consistent with cerebral atrophy.  No   hemorrhage.  No acute infarct.      Ventricles:  The ventricles are enlarged consistent with volume loss.      Bones/joints:  Unremarkable.      Sinuses:  Minimal scattered sinus disease.      Mastoid air  cells:  Unremarkable as visualized.  No mastoid effusion.      Orbits:  Unremarkable as visualized.      Impression:           No acute findings. No acute infarction    Cerebral atrophy      THIS DOCUMENT HAS BEEN ELECTRONICALLY SIGNED BY BRE GOLD MD        Assessment/Plan   Assessment / Plan     Hospital Problem List     * (Principal)Alcohol withdrawal syndrome with complication    Alcohol use    Dizziness    Essential hypertension    Nausea with vomiting        Assessment & Plan:  - Admit to telemetry  - VS q4h  - IVF   - Rally bag  - AM labs   - B-12, folate  - Start librium   - CIWA   - CT & MRI negative  - Continue home medications as appropriate   - Seizure precautions  - Case management   - Discharge planning needs    DVT prophylaxis: TEDs/SCDs/Lovenox SQ    CODE STATUS: FULL      Admission Status:   Observation.    Monica ERNST Pantoja, APRN   12/31/17   1:12 AM   Patient seen and examined at the bedside.  Patient is a 51-year-old  female with past medical history significant for hypertension and alcoholism.  Patient is from [place] and is visiting here.  Evidently she has not had alcohol for more than 24 hours.  According to  when she does not drink regularly she gets the tremors and also dizziness.  This morning patient woke up with nausea and later had episode of vomiting.  Also has had the episodes of dizziness and and it is not quite clear to me that and what she describes dizziness is not actually an unstable balance.  Denies any fever or chills.  No sign of viral illness recently although she does complain about sore throat for the past few days.    Constitutional: In bed, shaking, nervous and mildly agitated.  Eyes: PERRLA, sclerae anicteric, no conjunctival injection  HENT: NCAT, mucous membranes moist  Neck: Supple, no thyromegaly, no lymphadenopathy, trachea midline  Respiratory: Clear to auscultation bilaterally, nonlabored respirations   Cardiovascular: Tachycardia, no murmurs,  "rubs, or gallops, palpable pedal pulses bilaterally  Gastrointestinal: Positive bowel sounds, soft, nontender, nondistended  Musculoskeletal: No bilateral ankle edema, no clubbing or cyanosis to extremities  Psychiatric: Looks very anxious and agitated.  Neurologic: Awake, alert, oriented ×3.  Patient has shakes and is very irritable and mildly agitated.  Currently she is not dizzy.  Skin: No rashes    Assessment and plan:  * Symptoms of dizziness, unstable gait, nausea and vomiting, shakes.  Most likely all secondary to alcohol withdrawal.  We will admit the patient and monitor and control the symptoms with the Librium and Ativan.  Please see the above for more details.  I discussed the findings and the plan of care with the patient and her  at the bedside in detail.  For the time being patient will be admitted as observation.         Electronically signed by Teddy Pires MD at 12/31/2017  3:40 AM           Emergency Department Notes      Frank Trujillo MD at 12/30/2017  4:04 PM          Subjective   HPI Comments: Ms. Irais Love is a 52 year old female with a hx of vertigo who presents to the ED with c/o dizziness. The patient reports that she awoke this morning to \"head-spinning\" dizziness. She states that her dizziness has been intermittent since, and reports that it has been accompanied by nausea, vomiting, and diarrhea. The patient reports that closing her eyes does not help to alleviate the dizziness. She states that sitting up makes her light-headed. The patient denies any accompanying HA, numbness, or tingling. The patient denies any other acute sx at this time.    Patient is a 52 y.o. female presenting with dizziness.   History provided by:  Patient  Dizziness   Quality:  Head spinning  Severity:  Moderate  Onset quality:  Sudden  Duration: \"since this morning\"  Timing:  Intermittent  Chronicity:  New  Relieved by:  Nothing  Ineffective treatments:  Closing eyes  Associated symptoms: " diarrhea, nausea and vomiting    Associated symptoms: no headaches    Risk factors: hx of vertigo        Review of Systems   Gastrointestinal: Positive for diarrhea, nausea and vomiting.   Neurological: Positive for dizziness and light-headedness. Negative for numbness and headaches.   All other systems reviewed and are negative.      Past Medical History:   Diagnosis Date   • Disease of thyroid gland        No Known Allergies    History reviewed. No pertinent surgical history.    History reviewed. No pertinent family history.    Social History     Social History   • Marital status:      Spouse name: N/A   • Number of children: N/A   • Years of education: N/A     Social History Main Topics   • Smoking status: Never Smoker   • Smokeless tobacco: Never Used   • Alcohol use Yes   • Drug use: No   • Sexual activity: Not Asked     Other Topics Concern   • None     Social History Narrative   • None         Objective   Physical Exam   Constitutional: She is oriented to person, place, and time. She appears well-developed and well-nourished. No distress.   HENT:   Head: Normocephalic and atraumatic.   Eyes: Conjunctivae are normal. Pupils are equal, round, and reactive to light. No scleral icterus.   Neck: Normal range of motion. Neck supple.   Cardiovascular: Normal rate, regular rhythm and normal heart sounds.  Exam reveals no gallop and no friction rub.    No murmur heard.  Pulmonary/Chest: Effort normal and breath sounds normal. No respiratory distress. She has no wheezes. She has no rales.   Abdominal: Soft. Bowel sounds are normal. There is no tenderness. There is no guarding.   Musculoskeletal: Normal range of motion.   Neurological: She is alert and oriented to person, place, and time. No cranial nerve deficit. She exhibits normal muscle tone. Coordination normal.   No nystagmus.   Skin: Skin is warm and dry. She is not diaphoretic.   Psychiatric: She has a normal mood and affect. Her behavior is normal.    Nursing note and vitals reviewed.      Procedures        ED Course  ED Course     Normal neuro exam.   Labs c/w volume contraction s/t vomiting.  CT head negative.  Pt given serial medications and multiple bags of saline but reports no improvement in symptoms.  MRI performed to eval for cerebellar stroke, is negative.  Pt's  informs me she is a heavy drinker and is about 24 hours from last drink.  Withdrawal syndrome is possible but she doesn't fit that perfectly either.  Intolerant of PO and unable to get up due to dizziness, will admit for further care.                MDM  Number of Diagnoses or Management Options  Intractable vomiting with nausea, unspecified vomiting type:   Vertigo:      Amount and/or Complexity of Data Reviewed  Clinical lab tests: reviewed and ordered  Tests in the radiology section of CPT®:  ordered and reviewed  Decide to obtain previous medical records or to obtain history from someone other than the patient: yes  Obtain history from someone other than the patient: yes  Discuss the patient with other providers: yes  Independent visualization of images, tracings, or specimens: yes        Final diagnoses:   Intractable vomiting with nausea, unspecified vomiting type   Vertigo       Documentation assistance provided by krysten Bansal.  Information recorded by the scribe was done at my direction and has been verified and validated by me.     Lenny Bansal  12/30/17 1638       Frank Trujillo MD  12/31/17 0103       Electronically signed by Frank Trujillo MD at 12/31/2017  1:03 AM        Hospital Medications (all)       Dose Frequency Start End    acetaminophen (TYLENOL) tablet 650 mg 650 mg Every 4 Hours PRN 12/31/2017     Sig - Route: Take 2 tablets by mouth Every 4 (Four) Hours As Needed for Mild Pain . - Oral    chlordiazePOXIDE (LIBRIUM) capsule 25 mg 25 mg Every 8 Hours Scheduled 1/1/2018 1/3/2018    Sig - Route: Take 1 capsule by mouth Every 8 (Eight) Hours. -  "Oral    chlordiazePOXIDE (LIBRIUM) capsule 25 mg 25 mg Every 12 Hours 1/3/2018 1/5/2018    Sig - Route: Take 1 capsule by mouth Every 12 (Twelve) Hours. - Oral    chlordiazePOXIDE (LIBRIUM) capsule 25 mg 25 mg Daily 1/5/2018 1/7/2018    Sig - Route: Take 1 capsule by mouth Daily. - Oral    diphenhydrAMINE (BENADRYL) injection 50 mg 50 mg Once 12/30/2017 12/30/2017    Sig - Route: Infuse 1 mL into a venous catheter 1 (One) Time. - Intravenous    enoxaparin (LOVENOX) syringe 40 mg 40 mg Every 24 Hours 12/31/2017     Sig - Route: Inject 0.4 mL under the skin Daily. - Subcutaneous    famotidine (PEPCID) injection 20 mg 20 mg Once 12/30/2017 12/30/2017    Sig - Route: Infuse 2 mL into a venous catheter 1 (One) Time. - Intravenous    folic acid (FOLVITE) tablet 1 mg 1 mg Daily 1/1/2018 1/4/2018    Sig - Route: Take 1 tablet by mouth Daily. - Oral    Linked Group 1:  \"And\" Linked Group Details        LORazepam (ATIVAN) injection 0.5 mg 0.5 mg Once 12/30/2017 12/30/2017    Sig - Route: Infuse 0.25 mL into a venous catheter 1 (One) Time. - Intravenous    LORazepam (ATIVAN) injection 1 mg 1 mg Once 12/30/2017 12/30/2017    Sig - Route: Infuse 0.5 mL into a venous catheter 1 (One) Time. - Intravenous    LORazepam (ATIVAN) injection 1 mg 1 mg Once 12/31/2017 12/31/2017    Sig - Route: Infuse 0.5 mL into a venous catheter 1 (One) Time. - Intravenous    LORazepam (ATIVAN) injection 1 mg 1 mg Every 2 Hours PRN 12/31/2017 1/10/2018    Sig - Route: Infuse 0.5 mL into a venous catheter Every 2 (Two) Hours As Needed for Withdrawal (For CIWA score 8-10). - Intravenous    Linked Group 2:  \"Or\" Linked Group Details        LORazepam (ATIVAN) injection 2 mg 2 mg Every 1 Hour PRN 12/31/2017 1/10/2018    Sig - Route: Infuse 1 mL into a venous catheter Every 1 (One) Hour As Needed for Withdrawal (For CIWA score 11-15). - Intravenous    Linked Group 2:  \"Or\" Linked Group Details        LORazepam (ATIVAN) injection 2 mg 2 mg Every 15 Minutes " "PRN 12/31/2017 1/10/2018    Sig - Route: Infuse 1 mL into a venous catheter Every 15 (Fifteen) Minutes As Needed for Anxiety (Use IV route first.  If unable to give IV, use IM.  For CIWA-Ar greater than 15.  Repeat dose in 15 minutes if CIWA-Ar is not decreasing.). - Intravenous    Linked Group 2:  \"Or\" Linked Group Details        LORazepam (ATIVAN) injection 2 mg 2 mg Every 15 Minutes PRN 12/31/2017 1/10/2018    Sig - Route: Inject 1 mL into the shoulder, thigh, or buttocks Every 15 (Fifteen) Minutes As Needed for Withdrawal (Use IV route first.  If unable to give IV, use IM.  For CIWA-Ar greater than 15.  Repeat dose in 15 minutes if CIWA-Ar is not decreasing.). - Intramuscular    Linked Group 2:  \"Or\" Linked Group Details        LORazepam (ATIVAN) tablet 1 mg 1 mg Every 2 Hours PRN 12/31/2017 1/10/2018    Sig - Route: Take 1 tablet by mouth Every 2 (Two) Hours As Needed for Withdrawal (For CIWA score 8-10). - Oral    Linked Group 2:  \"Or\" Linked Group Details        LORazepam (ATIVAN) tablet 2 mg 2 mg Every 1 Hour PRN 12/31/2017 1/10/2018    Sig - Route: Take 2 tablets by mouth Every 1 (One) Hour As Needed for Withdrawal (For CIWA score 11-15). - Oral    Linked Group 2:  \"Or\" Linked Group Details        Magnesium Sulfate 2 gram Bolus, followed by 8 gram infusion (total Mg dose 10 grams)- Mg less than or equal to 1mg/dL 2 g As Needed 12/31/2017     Sig - Route: Infuse 50 mL into a venous catheter As Needed (Mg less than or equal to 1mg/dL). - Intravenous    Linked Group 3:  \"Or\" Linked Group Details        magnesium sulfate 4 gram infusion- Mg 1.6-1.9 mg/dL 4 g As Needed 12/31/2017     Sig - Route: Infuse 100 mL into a venous catheter As Needed (Mg 1.6-1.9 mg/dL). - Intravenous    Linked Group 3:  \"Or\" Linked Group Details        Magnesium Sulfate 6 gram Infusion (2 gm x 3) -Mg 1.1 -1.5 mg/dL 2 g As Needed 12/31/2017     Sig - Route: Infuse 50 mL into a venous catheter As Needed (Mg 1.1 -1.5 mg/dL). - Intravenous " "   Linked Group 3:  \"Or\" Linked Group Details        Magnesium Sulfate 6 gram Infusion (2 gm x 3) -Mg 1.1 -1.5 mg/dL 2 g As Needed 12/31/2017     Sig - Route: Infuse 50 mL into a venous catheter As Needed (Mg 1.1 -1.5 mg/dL). - Intravenous    melatonin sublingual tablet 5 mg 5 mg Nightly PRN 1/1/2018     Sig - Route: Place 1 tablet under the tongue At Night As Needed (sleep). - Sublingual    multiple vitamin (M.V.I. Adult) 10 mL, thiamine (B-1) 100 mg, folic acid 1 mg, magnesium sulfate 2 g in sodium chloride 0.9 % 1,000 mL infusion 100 mL/hr Daily 12/31/2017 1/2/2018    Sig - Route: Infuse 100 mL/hr into a venous catheter Daily. - Intravenous    multivitamin (THERAGRAN) tablet 1 tablet 1 tablet Daily 1/1/2018 1/4/2018    Sig - Route: Take 1 tablet by mouth Daily. - Oral    Linked Group 1:  \"And\" Linked Group Details        ondansetron (ZOFRAN) injection 4 mg 4 mg Once 12/30/2017 12/30/2017    Sig - Route: Infuse 2 mL into a venous catheter 1 (One) Time. - Intravenous    potassium chloride (KLOR-CON) packet 40 mEq 40 mEq As Needed 12/31/2017     Sig - Route: Take 40 mEq by mouth As Needed (potassium replacement, see admin instructions). - Oral    Linked Group 4:  \"Or\" Linked Group Details        potassium chloride (MICRO-K) CR capsule 40 mEq 40 mEq As Needed 12/31/2017     Sig - Route: Take 4 capsules by mouth As Needed (potassium replacement.  see admin instructions). - Oral    Linked Group 4:  \"Or\" Linked Group Details        potassium chloride 10 mEq in 100 mL IVPB 10 mEq Every 1 Hour PRN 12/31/2017     Sig - Route: Infuse 100 mL into a venous catheter Every 1 (One) Hour As Needed (potassium protocol PERIPHERAL - see admin instructions). - Intravenous    Linked Group 4:  \"Or\" Linked Group Details        promethazine (PHENERGAN) injection 12.5 mg 12.5 mg Once 12/30/2017 12/30/2017    Sig - Route: Infuse 0.5 mL into a venous catheter 1 (One) Time. - Intravenous    promethazine (PHENERGAN) injection 6.25 mg 6.25 mg " "Once 12/30/2017 12/30/2017    Sig - Route: Infuse 0.25 mL into a venous catheter 1 (One) Time. - Intravenous    sodium chloride 0.9 % bolus 1,000 mL 1,000 mL Once 12/30/2017 12/30/2017    Sig - Route: Infuse 1,000 mL into a venous catheter 1 (One) Time. - Intravenous    sodium chloride 0.9 % bolus 1,000 mL 1,000 mL Once 12/30/2017 12/30/2017    Sig - Route: Infuse 1,000 mL into a venous catheter 1 (One) Time. - Intravenous    sodium chloride 0.9 % flush 1-10 mL 1-10 mL As Needed 12/31/2017     Sig - Route: Infuse 1-10 mL into a venous catheter As Needed for Line Care. - Intravenous    sodium chloride 0.9 % flush 10 mL 10 mL As Needed 12/30/2017     Sig - Route: Infuse 10 mL into a venous catheter As Needed for Line Care. - Intravenous    Cosign for Ordering: Accepted by Michael Najera MD on 12/30/2017  5:03 PM    thiamine (VITAMIN B-1) tablet 100 mg 100 mg Daily 1/1/2018 1/4/2018    Sig - Route: Take 1 tablet by mouth Daily. - Oral    Linked Group 1:  \"And\" Linked Group Details        chlordiazePOXIDE (LIBRIUM) capsule 50 mg (Discontinued) 50 mg Every 8 Hours Scheduled 12/31/2017 1/1/2018    Sig - Route: Take 2 capsules by mouth Every 8 (Eight) Hours. - Oral    diazePAM (VALIUM) injection 2 mg (Discontinued) 2 mg Once 12/30/2017 12/30/2017    Sig - Route: Infuse 0.4 mL into a venous catheter 1 (One) Time. - Intravenous    levothyroxine (SYNTHROID, LEVOTHROID) tablet 137 mcg (Discontinued) 137 mcg Every Early Morning 12/31/2017 12/31/2017    Sig - Route: Take 1 tablet by mouth Every Morning. - Oral          Lab Results (last 72 hours)     Procedure Component Value Units Date/Time    CBC & Differential [[id number]] Collected:  12/30/17 1503    Specimen:  Blood Updated:  12/30/17 1524    Narrative:       The following orders were created for panel order CBC & Differential.  Procedure                               Abnormality         Status                     ---------                               -----------         " ------                     CBC Auto Differential[[id number]]        Abnormal            Final result                 Please view results for these tests on the individual orders.    CBC Auto Differential [[id number]]  (Abnormal) Collected:  12/30/17 1503    Specimen:  Blood Updated:  12/30/17 1524     WBC 5.17 10*3/mm3      RBC 3.13 (L) 10*6/mm3      Hemoglobin 12.3 g/dL      Hematocrit 35.6 %      .7 (H) fL      MCH 39.3 (H) pg      MCHC 34.6 g/dL      RDW 15.5 (H) %      RDW-SD 64.3 (H) fl      MPV 11.7 fL      Platelets 90 (L) 10*3/mm3      Neutrophil % 82.2 (H) %      Lymphocyte % 8.9 (L) %      Monocyte % 8.1 %      Eosinophil % 0.0 %      Basophil % 0.6 %      Immature Grans % 0.2 %      Neutrophils, Absolute 4.25 10*3/mm3      Lymphocytes, Absolute 0.46 (L) 10*3/mm3      Monocytes, Absolute 0.42 10*3/mm3      Eosinophils, Absolute 0.00 10*3/mm3      Basophils, Absolute 0.03 10*3/mm3      Immature Grans, Absolute 0.01 10*3/mm3     Comprehensive Metabolic Panel [[id number]]  (Abnormal) Collected:  12/30/17 1503    Specimen:  Blood Updated:  12/30/17 1542     Glucose 184 (H) mg/dL      BUN 6 (L) mg/dL      Creatinine 0.80 mg/dL      Sodium 137 mmol/L      Potassium 2.9 (L) mmol/L      Chloride 101 mmol/L      CO2 19.0 (L) mmol/L      Calcium 8.7 mg/dL      Total Protein 7.8 g/dL      Albumin 3.80 g/dL      ALT (SGPT) 32 U/L      AST (SGOT) 105 (H) U/L      Alkaline Phosphatase 104 (H) U/L      Total Bilirubin 1.7 (H) mg/dL      eGFR Non African Amer 75 mL/min/1.73      Globulin 4.0 gm/dL      A/G Ratio 1.0 (L) g/dL      BUN/Creatinine Ratio 7.5     Anion Gap 17.0 (H) mmol/L     Narrative:       National [employer] Guidelines    Stage     Description        GFR  1         Normal or High     90+  2         Mild decrease      60-89  3         Moderate decrease  30-59  4         Severe decrease    15-29  5         Kidney failure     <15    Lipase [[id number]]  (Abnormal) Collected:  12/30/17 8569     Specimen:  Blood Updated:  12/30/17 1542     Lipase 57 (H) U/L     Urinalysis With / Culture If Indicated - Urine, Clean Catch [[id number]]  (Abnormal) Collected:  12/30/17 1537    Specimen:  Urine from Urine, Clean Catch Updated:  12/30/17 1554     Color, UA Yellow     Appearance, UA Clear     pH, UA 7.0     Specific Gravity, UA 1.013     Glucose, UA Negative     Ketones, UA Negative     Bilirubin, UA Negative     Blood, UA Moderate (2+) (A)     Protein, UA Negative     Leuk Esterase, UA Negative     Nitrite, UA Negative     Urobilinogen, UA 0.2 E.U./dL    Urinalysis, Microscopic Only - Urine, Clean Catch [[id number]]  (Abnormal) Collected:  12/30/17 1537    Specimen:  Urine from Urine, Clean Catch Updated:  12/30/17 1554     RBC, UA 0-2 /HPF      WBC, UA 0-2 (A) /HPF      Bacteria, UA None Seen /HPF      Squamous Epithelial Cells, UA 7-12 (A) /HPF      Hyaline Casts, UA 0-6 /LPF      Methodology Automated Microscopy    Pregnancy, Urine - Urine, Clean Catch [[id number]]  (Normal) Collected:  12/30/17 1537    Specimen:  Urine from Urine, Clean Catch Updated:  12/30/17 1606     HCG, Urine QL Negative    Green Top (Gel) [[id number]] Collected:  12/30/17 1503    Specimen:  Blood Updated:  12/30/17 1616     Extra Tube Hold for add-ons.      Auto resulted.       Light Blue Top [[id number]] Collected:  12/30/17 1503    Specimen:  Blood Updated:  12/30/17 1616     Extra Tube hold for add-on      Auto resulted       Lavender Top [[id number]] Collected:  12/30/17 1503    Specimen:  Blood Updated:  12/30/17 1616     Extra Tube hold for add-on      Auto resulted       Gold Top - SST [[id number]] Collected:  12/30/17 1503    Specimen:  Blood Updated:  12/30/17 1616     Extra Tube Hold for add-ons.      Auto resulted.       CBC (No Diff) [[id number]]  (Abnormal) Collected:  12/31/17 0509    Specimen:  Blood Updated:  12/31/17 0700     WBC 5.86 10*3/mm3      RBC 2.67 (L) 10*6/mm3      Hemoglobin 10.3 (L) g/dL      Hematocrit 31.3 (L) %       .2 (H) fL      MCH 38.6 (H) pg      MCHC 32.9 g/dL      RDW 16.0 (H) %      RDW-SD 68.3 (H) fl      MPV 12.1 (H) fL      Platelets 76 (L) 10*3/mm3     Calcium, Ionized [[id number]]  (Abnormal) Collected:  12/31/17 0509    Specimen:  Blood Updated:  12/31/17 0704     Ionized Calcium 1.08 (L) mmol/L     Magnesium [[id number]]  (Normal) Collected:  12/31/17 0509    Specimen:  Blood Updated:  12/31/17 0725     Magnesium 1.3 mg/dL     Phosphorus [[id number]]  (Normal) Collected:  12/31/17 0509    Specimen:  Blood Updated:  12/31/17 0725     Phosphorus 2.9 mg/dL     TSH [[id number]]  (Abnormal) Collected:  12/31/17 0509    Specimen:  Blood Updated:  12/31/17 0725     TSH 0.016 (L) mIU/mL     Narrative:       Due to abnormal TSH results, suggest ordering Free T4.    Basic Metabolic Panel [[id number]]  (Abnormal) Collected:  12/31/17 0509    Specimen:  Blood Updated:  12/31/17 0728     Glucose 71 mg/dL      BUN 7 (L) mg/dL      Creatinine 0.90 mg/dL      Sodium 139 mmol/L      Potassium 2.5 (C) mmol/L      Chloride 104 mmol/L      CO2 24.0 mmol/L      Calcium 7.7 (L) mg/dL      eGFR Non African Amer 65 mL/min/1.73      BUN/Creatinine Ratio 7.8     Anion Gap 11.0 mmol/L     Narrative:       National Kid[employer] Foundation Guidelines    Stage     Description        GFR  1         Normal or High     90+  2         Mild decrease      60-89  3         Moderate decrease  30-59  4         Severe decrease    15-29  5         Kidney failure     <15    T4, Free [[id number]]  (Normal) Collected:  12/31/17 0509    Specimen:  Blood Updated:  12/31/17 0842     Free T4 1.07 ng/dL     Folate [[id number]]  (Normal) Collected:  12/30/17 1503    Specimen:  Blood Updated:  12/31/17 0859     Folate 5.05 ng/mL     Narrative:         Folate Reference Ranges:    Deficient:            Less than 1.2 ng/mL  Indeterminant:        1.2-3.1 ng/mL  Normal:               3.2-20.0 ng/mL    Vitamin B12 [[id number]]  (Normal) Collected:  12/30/17 1793     Specimen:  Blood Updated:  12/31/17 0859     Vitamin B-12 342 pg/mL     [facility] Draw [[id number]] Collected:  12/30/17 1503    Specimen:  Blood Updated:  12/31/17 1059    Narrative:       The following orders were created for panel order [facility] Draw.  Procedure                               Abnormality         Status                     ---------                               -----------         ------                     Light Blue Top[[id number]]                                   Final result               Green Top (Gel)[[id number]]                                  Final result               Lavender Top[[id number]]                                     Final result               Gold Top - SST[[id number]]                                   Final result               Green Top (No Gel)[[id number]]                                                            Please view results for these tests on the individual orders.    Potassium [[id number]]  (Normal) Collected:  12/31/17 2102    Specimen:  Blood Updated:  12/31/17 2146     Potassium 3.8 mmol/L     CBC (No Diff) [[id number]]  (Abnormal) Collected:  01/01/18 0622    Specimen:  Blood Updated:  01/01/18 0709     WBC 5.66 10*3/mm3      RBC 2.64 (L) 10*6/mm3      Hemoglobin 10.3 (L) g/dL      Hematocrit 30.9 (L) %      .0 (H) fL      MCH 39.0 (H) pg      MCHC 33.3 g/dL      RDW 15.5 (H) %      RDW-SD 66.4 (H) fl      MPV 12.1 (H) fL      Platelets 71 (L) 10*3/mm3     Magnesium [[id number]]  (Normal) Collected:  01/01/18 0622    Specimen:  Blood Updated:  01/01/18 0715     Magnesium 2.0 mg/dL     Comprehensive Metabolic Panel [[id number]]  (Abnormal) Collected:  01/01/18 0622    Specimen:  Blood Updated:  01/01/18 0721     Glucose 95 mg/dL      BUN 5 (L) mg/dL      Creatinine 0.70 mg/dL      Sodium 134 mmol/L      Potassium 3.2 (L) mmol/L      Chloride 105 mmol/L      CO2 18.0 (L) mmol/L      Calcium 7.4 (L) mg/dL      Total Protein 6.3 g/dL      Albumin 3.00 (L) g/dL       ALT (SGPT) 22 U/L      AST (SGOT) 59 (H) U/L      Alkaline Phosphatase 78 U/L      Total Bilirubin 1.3 (H) mg/dL      eGFR Non African Amer 88 mL/min/1.73      Globulin 3.3 gm/dL      A/G Ratio 0.9 (L) g/dL      BUN/Creatinine Ratio 7.1     Anion Gap 11.0 mmol/L     Narrative:       National Kidney Foundation Guidelines    Stage     Description        GFR  1         Normal or High     90+  2         Mild decrease      60-89  3         Moderate decrease  30-59  4         Severe decrease    15-29  5         Kidney failure     <15    Influenza A & B, RT PCR - Swab, Nasopharynx [[id number]]  (Normal) Collected:  18 1000    Specimen:  Swab from Nasopharynx Updated:  18 1117     Influenza A PCR Not Detected     Influenza B PCR Not Detected    Potassium [[id number]]  (Normal) Collected:  18 0234    Specimen:  Blood Updated:  18 0332     Potassium 4.3 mmol/L       Result checked       T3, Free [[id number]]  (Abnormal) Collected:  18 0623    Specimen:  Blood Updated:  18 1308     T3, Free 1.7 (L) pg/mL     Narrative:       Performed at:  [address] [place] [address]  : Pipe Beatty PhD, Phone:  [phone]             Physician Progress Notes (all)      Jerry Eisenberg MD at 2017  3:40 PM  Version 1 of 1             [facility] Medicine Services  PROGRESS NOTE    Patient Name: Irais Love  : 1965  MRN: [id number]    Date of Admission: 2017  Length of Stay: 0  Primary Care Physician: No Known Provider    Subjective   Subjective     CC:    Alcohol withdrawal    HPI:    No further nausea or vomiting. Legs still feel wobbly. Wants to be discharged today.    Review of Systems   HENT: Negative.    Eyes: Negative.    Respiratory: Negative.    Cardiovascular: Negative.    Gastrointestinal: Negative.    Endocrine: Negative.    Genitourinary: Negative.    Musculoskeletal: Negative.    Skin: Negative.     Allergic/Immunologic: Negative.    Neurological: Positive for tremors and weakness.   Hematological: Negative.    Psychiatric/Behavioral: Negative.          Otherwise ROS is negative except as mentioned in the HPI.    Objective   Objective     Vital Signs:   Temp:  [98 °F (36.7 °C)-98.7 °F (37.1 °C)] 98.7 °F (37.1 °C)  Heart Rate:  [87-98] 89  Resp:  [16-20] 16  BP: (123-180)/(64-93) 152/75        Physical Exam:  Constitutional -in bed, slightly anxious, mild tremor, spouse at bedside  HEENT-NCAT, mucous membranes moist  CV-regular, occasional mild tachycardia, no murmur  Resp-CTAB, no wheezes, rhonchi or rales  Abd-soft, non-tender, non-distended, normo active bowel sounds  Ext-No lower extremity cyanosis, clubbing or edema bilaterally  Neuro-alert, speech clear, moves all extremities, mild hand tremors  Psych-slightly anxious affect   Skin- No rash on exposed UE or LE bilaterally      Results Reviewed:  I have personally reviewed current lab, radiology, and data and agree.      Results from last 7 days  Lab Units 12/31/17  0509 12/30/17  1503   WBC 10*3/mm3 5.86 5.17   HEMOGLOBIN g/dL 10.3* 12.3   HEMATOCRIT % 31.3* 35.6   PLATELETS 10*3/mm3 76* 90*       Results from last 7 days  Lab Units 12/31/17  0509 12/30/17  1503   SODIUM mmol/L 139 137   POTASSIUM mmol/L 2.5* 2.9*   CHLORIDE mmol/L 104 101   CO2 mmol/L 24.0 19.0*   BUN mg/dL 7* 6*   CREATININE mg/dL 0.90 0.80   GLUCOSE mg/dL 71 184*   CALCIUM mg/dL 7.7* 8.7   ALT (SGPT) U/L  --  32   AST (SGOT) U/L  --  105*     No results found for: BNP  No results found for: PHART    Microbiology Results Abnormal     None          Imaging Results (last 24 hours)     Procedure Component Value Units Date/Time    MRI Brain Without Contrast [[id number]] Collected:  12/30/17 2057     Updated:  12/30/17 2218    Narrative:       EXAM:    MR Head Without Intravenous Contrast    CLINICAL HISTORY:    52 years old, female; Signs and symptoms; Dizziness and other: Nausea,    vomiting; Patient HX: Possible acute CVA, dizziness, vertigo, nausea, vomiting;   Additional info: Intractable vertigo, concern for cerebellar stroke    TECHNIQUE:    Magnetic resonance images of the head/brain without intravenous contrast in   multiple planes.    COMPARISON:    CT HEAD WO CONTRAST 2017-12-30 17:52    FINDINGS:      Brain:  The cortical sulci are enlarged consistent with cerebral atrophy.  No   hemorrhage.  No acute infarct.      Ventricles:  The ventricles are enlarged consistent with volume loss.      Bones/joints:  Unremarkable.      Sinuses:  Minimal scattered sinus disease.      Mastoid air cells:  Unremarkable as visualized.  No mastoid effusion.      Orbits:  Unremarkable as visualized.      Impression:           No acute findings. No acute infarction    Cerebral atrophy      THIS DOCUMENT HAS BEEN ELECTRONICALLY SIGNED BY BER GOLD MD    CT Head Without Contrast [[id number]] Collected:  12/31/17 1340     Updated:  12/31/17 1352    Narrative:       EXAMINATION: CT HEAD WO CONTRAST - 12/31/2017     INDICATION: Vertigo, vomiting.      TECHNIQUE: Axial CT of the head without intravenous contrast  administration.     The radiation dose reduction device was turned on for each scan per the  ALARA (As Low as Reasonably Achievable) protocol.     COMPARISON: None.     FINDINGS: Midline structures are symmetric without evidence of mass,  mass effect or midline shift. No intra-axial hemorrhage. Ventricles and  sulci within normal limits. Basal cisterns patent. Globes and orbits  unremarkable. Visualized paranasal sinuses demonstrate circumferential  mucosal thickening of the bilateral maxillary sinuses and ethmoid air  cells; otherwise, grossly clear and well-pneumatized. Mastoid air cells  are grossly clear. Calvarium is intact without depressed fracture or  aggressive lesion.       Impression:       1. No acute intracranial abnormality.  2. Paranasal mucosal edema of the maxillary sinuses and  ethmoid air  cells may represent component of sinusitis.     DICTATED:     12/30/2017  EDITED:          12/31/2017     This report was finalized on 12/31/2017 1:50 PM by Dr. Garry Sullivan.                I have reviewed the medications.    chlordiazePOXIDE 50 mg Oral Q8H   enoxaparin 40 mg Subcutaneous Q24H   [START ON 1/1/2018] vitamin B-1 100 mg Oral Daily   And      [START ON 1/1/2018] multivitamin 1 tablet Oral Daily   And      [START ON 1/1/2018] folic acid 1 mg Oral Daily   IV Fluids 1000 mL + additives 100 mL/hr Intravenous Daily       Assessment/Plan   Assessment / Plan     Hospital Problem List     * (Principal)Alcohol withdrawal syndrome with complication    Alcohol use    Dizziness    Essential hypertension    Nausea with vomiting    Intractable vomiting with nausea             Brief Hospital Course to date:  Irais Love is a 52 y.o. female n/v, dizziness and alcohol withdrawal      Assessment & Plan:    Acute Alcohol Withdrawal  - suspect symptoms started after patient developed nausea and vomiting with concurrent inability to drink alcohol. MRI brain obtained with no findings suggestive of acute stroke, and both GI symptoms and dizziness have improved. Spouse says Mrs Love has experienced similar events in the past. Lab work, including elevated LFTs and low platelets consistent with chronic alcohol consumption.    Patient currently being treated under [facility] protocol with scheduled librium and prn ativan. Thiamine/rally pack also provided daily.    Long discussion in private with patient and spouse regarding goals with respect to alcohol abstinence. It does not appear patient currently desires to quit drinking.  Agreeable to social work visit with abstinence resources.    From a withdrawal standpoint, discharge appears premature as patient only admitted early this am, and withdrawal symptoms can easily last several days or more.      Without a commitment to abstinence, I would furthermore be reluctant to  provide an outpatient benzodiazepine taper to Mrs Love due to the risks of combining alcohol with sedating medications. The spouse says this concern has been voiced by other providers in the past as well.     For now, recommended strongly to patient and family to remain hospitalized until withdrawal symptoms subside.  Both  and patient aware that even hospitalized, complications are still possible, including seizures or drug reactions.      Hypothyroid  - TSH supressed, checked Free T4 (wnl), free T3 pending. Held levothyroxine today, will step down dose at discharge    TCP  - likely due to Etoh     Elevated LFTs    Anemia  - elevated MCV, consistent with bone marrow suppression from alcohol  - continue supplementation  - check b12/folate levels on blood drawn at admit.    Hypokalemia  - likely due to N/V  - replace k/mag  - check cmp and mag in am    N/V  - resolved, restart bland diet    DVT Prophylaxis:  lovenox    CODE STATUS: Full Code     More than 50% of time spent counseling on current illness and plan of care. Case discussed with: patient and spouse, with specific focus on treatment plan for alcohol withdrawal  Total time spent face to face with the patient was 25 minutes.  Total time of the encounter was 40 minutes.      Disposition: I expect the patient to be discharged home in 2-3 days.    Jerry Eisenberg MD  17  3:40 PM           Electronically signed by Jerry Eisenberg MD at 2017  4:04 PM      Jerry Eisenberg MD at 2018  8:46 AM  Version 1 of 1             [facility] Medicine Services  PROGRESS NOTE    Patient Name: Irais Love  : 1965  MRN: [id number]    Date of Admission: 2017  Length of Stay: 1  Primary Care Physician: No Known Provider    Subjective   Subjective     CC:    Alcohol withdrawal    HPI:    Still feels a little unsteady on her feet. Cough and nasal congestion. Denies fever.     Review of Systems   HENT: Negative.    Eyes:  Negative.    Respiratory: Negative.    Cardiovascular: Negative.    Gastrointestinal: Negative.    Endocrine: Negative.    Genitourinary: Negative.    Musculoskeletal: Negative.    Skin: Negative.    Allergic/Immunologic: Negative.    Neurological: Positive for tremors and weakness.   Hematological: Negative.    Psychiatric/Behavioral: Negative.          Otherwise ROS is negative except as mentioned in the HPI.    Objective   Objective     Vital Signs:   Temp:  [97.5 °F (36.4 °C)-99 °F (37.2 °C)] 99 °F (37.2 °C)  Heart Rate:  [] 86  Resp:  [16-20] 18  BP: (141-165)/(75-86) 165/79        Physical Exam:    Constitutional -in bed, asleep but arouses easily to voice. Very slight hand tremor noted. Able to ambulate unassisted to bathroom, a little unsteady at first.  HEENT-NCAT, mucous membranes moist  CV-regular rate and rhythm, no murmur  Resp-CTAB, no wheezes, rhonchi or rales  Abd-soft, non-tender, non-distended, normo active bowel sounds  Ext-No lower extremity cyanosis, clubbing or edema bilaterally  Neuro-alert, speech clear, moves all extremities, mild hand tremors  Psych-still with slightly anxious affect   Skin- No rash on exposed UE or LE bilaterally      Results Reviewed:  I have personally reviewed current lab, radiology, and data and agree.      Results from last 7 days  Lab Units 01/01/18  0622 12/31/17  0509 12/30/17  1503   WBC 10*3/mm3 5.66 5.86 5.17   HEMOGLOBIN g/dL 10.3* 10.3* 12.3   HEMATOCRIT % 30.9* 31.3* 35.6   PLATELETS 10*3/mm3 71* 76* 90*       Results from last 7 days  Lab Units 01/01/18  0622 12/31/17  2102 12/31/17  0509 12/30/17  1503   SODIUM mmol/L 134  --  139 137   POTASSIUM mmol/L 3.2* 3.8 2.5* 2.9*   CHLORIDE mmol/L 105  --  104 101   CO2 mmol/L 18.0*  --  24.0 19.0*   BUN mg/dL 5*  --  7* 6*   CREATININE mg/dL 0.70  --  0.90 0.80   GLUCOSE mg/dL 95  --  71 184*   CALCIUM mg/dL 7.4*  --  7.7* 8.7   ALT (SGPT) U/L 22  --   --  32   AST (SGOT) U/L 59*  --   --  105*     No  results found for: BNP  No results found for: PHART    Microbiology Results Abnormal     None          Imaging Results (last 24 hours)     Procedure Component Value Units Date/Time    CT Head Without Contrast [[id number]] Collected:  12/31/17 1340     Updated:  12/31/17 1352    Narrative:       EXAMINATION: CT HEAD WO CONTRAST - 12/31/2017     INDICATION: Vertigo, vomiting.      TECHNIQUE: Axial CT of the head without intravenous contrast  administration.     The radiation dose reduction device was turned on for each scan per the  ALARA (As Low as Reasonably Achievable) protocol.     COMPARISON: None.     FINDINGS: Midline structures are symmetric without evidence of mass,  mass effect or midline shift. No intra-axial hemorrhage. Ventricles and  sulci within normal limits. Basal cisterns patent. Globes and orbits  unremarkable. Visualized paranasal sinuses demonstrate circumferential  mucosal thickening of the bilateral maxillary sinuses and ethmoid air  cells; otherwise, grossly clear and well-pneumatized. Mastoid air cells  are grossly clear. Calvarium is intact without depressed fracture or  aggressive lesion.       Impression:       1. No acute intracranial abnormality.  2. Paranasal mucosal edema of the maxillary sinuses and ethmoid air  cells may represent component of sinusitis.     DICTATED:     12/30/2017  EDITED:          12/31/2017     This report was finalized on 12/31/2017 1:50 PM by Dr. Garry Sullivan.                I have reviewed the medications.    chlordiazePOXIDE 25 mg Oral Q8H   enoxaparin 40 mg Subcutaneous Q24H   vitamin B-1 100 mg Oral Daily   And      multivitamin 1 tablet Oral Daily   And      folic acid 1 mg Oral Daily   IV Fluids 1000 mL + additives 100 mL/hr Intravenous Daily       Assessment/Plan   Assessment / Plan     Hospital Problem List     * (Principal)Alcohol withdrawal syndrome with complication    Alcohol use    Dizziness    Essential hypertension    Nausea with vomiting     Intractable vomiting with nausea             Brief Hospital Course to date:  Irais Love is a 52 y.o. female n/v, dizziness and alcohol withdrawal      Assessment & Plan:    Acute Alcohol Withdrawal  - suspect symptoms started after patient developed nausea and vomiting with concurrent inability to drink alcohol. MRI brain obtained with no findings suggestive of acute stroke, and both GI symptoms and dizziness have improved. Spouse says Mrs Love has experienced similar events in the past. Lab work, including elevated LFTs and low platelets consistent with chronic alcohol consumption.    - Patient currently being treated under [facility] protocol with scheduled librium and prn ativan. Thiamine/rally pack also provided daily.    - Will taper scheduled librium today    - Consider serax taper at discharge, but this will depend on how committed patient is to seeking recovery from alcohol abuse. Will discuss again with her this afternoon. Social work to visit patient and provide abstinence resources.    - hospitalization somewhat complicated by fact that patient and spouse are from [place], here visiting family. Their departure flight was scheduled for this moring, but medically I think it would be unwise to rush the discharge so they can make the trip home.  If the patient remains stable from a withdrawal standpoint with tapered benzodiazepines, one may consider discharge tomorrow (early afternoon) - and I discussed this possibility with the patient and her spouse - but we will need to continue to monitor her response to the lower dose of medications.  They are in agreement with this plan    Cough and sinus congestion  - afebrile, no leukocytosis, however influenza is rampant -- will check flu PCR and a CXR today.    Hypothyroid  - TSH supressed, checked Free T4 (wnl), free T3 pending. Held levothyroxine today, will step down dose at discharge    TCP  - likely due to Etoh     Elevated LFTs    Anemia  - elevated MCV,  consistent with bone marrow suppression from alcohol  - continue supplementation  - check b12/folate levels on blood drawn at admit.    Hypokalemia  - likely due to N/V  - improved today, continue to replace k/mag  - bmp am    N/V  - resolved, restart bland diet    DVT Prophylaxis:  lovenox    CODE STATUS: Full Code     Disposition: I expect the patient to be discharged home in 1-2 days.    Jerry Eisenberg MD  01/01/18  8:46 AM           Electronically signed by Jerry Eisenberg MD at 1/1/2018  9:01 AM        Patient DC'd home on 1/2. No notes available yet for 1/2.